# Patient Record
Sex: MALE | Race: WHITE | NOT HISPANIC OR LATINO
[De-identification: names, ages, dates, MRNs, and addresses within clinical notes are randomized per-mention and may not be internally consistent; named-entity substitution may affect disease eponyms.]

---

## 2017-05-23 ENCOUNTER — FORM ENCOUNTER (OUTPATIENT)
Age: 72
End: 2017-05-23

## 2017-05-24 ENCOUNTER — APPOINTMENT (OUTPATIENT)
Dept: ORTHOPEDIC SURGERY | Facility: CLINIC | Age: 72
End: 2017-05-24

## 2017-05-24 ENCOUNTER — OUTPATIENT (OUTPATIENT)
Dept: OUTPATIENT SERVICES | Facility: HOSPITAL | Age: 72
LOS: 1 days | End: 2017-05-24
Payer: MEDICARE

## 2017-05-24 VITALS — HEIGHT: 70 IN | BODY MASS INDEX: 26.92 KG/M2 | WEIGHT: 188 LBS

## 2017-05-24 DIAGNOSIS — K04.7 PERIAPICAL ABSCESS W/OUT SINUS: ICD-10-CM

## 2017-05-24 DIAGNOSIS — I87.2 VENOUS INSUFFICIENCY (CHRONIC) (PERIPHERAL): ICD-10-CM

## 2017-05-24 DIAGNOSIS — Z60.2 PROBLEMS RELATED TO LIVING ALONE: ICD-10-CM

## 2017-05-24 PROCEDURE — 72170 X-RAY EXAM OF PELVIS: CPT

## 2017-05-24 PROCEDURE — 73564 X-RAY EXAM KNEE 4 OR MORE: CPT | Mod: 26,50

## 2017-05-24 PROCEDURE — 72170 X-RAY EXAM OF PELVIS: CPT | Mod: 26

## 2017-05-24 PROCEDURE — 73564 X-RAY EXAM KNEE 4 OR MORE: CPT

## 2017-05-24 RX ORDER — METHYLPRED ACET/NACL,ISO-OS/PF 40 MG/ML
40 VIAL (ML) INJECTION
Qty: 1 | Refills: 0 | Status: COMPLETED | OUTPATIENT
Start: 2017-05-24

## 2017-05-24 RX ADMIN — Medication MG/ML: at 00:00

## 2017-05-24 SDOH — SOCIAL STABILITY - SOCIAL INSECURITY: PROBLEMS RELATED TO LIVING ALONE: Z60.2

## 2017-08-01 ENCOUNTER — MEDICATION RENEWAL (OUTPATIENT)
Age: 72
End: 2017-08-01

## 2018-06-28 ENCOUNTER — APPOINTMENT (OUTPATIENT)
Dept: VASCULAR SURGERY | Facility: CLINIC | Age: 73
End: 2018-06-28
Payer: MEDICARE

## 2018-06-28 ENCOUNTER — OTHER (OUTPATIENT)
Age: 73
End: 2018-06-28

## 2018-06-28 VITALS
DIASTOLIC BLOOD PRESSURE: 70 MMHG | HEIGHT: 70 IN | SYSTOLIC BLOOD PRESSURE: 104 MMHG | BODY MASS INDEX: 27.2 KG/M2 | WEIGHT: 190 LBS | HEART RATE: 74 BPM

## 2018-06-28 PROCEDURE — 93970 EXTREMITY STUDY: CPT

## 2018-06-28 PROCEDURE — 99203 OFFICE O/P NEW LOW 30 MIN: CPT

## 2018-06-28 RX ORDER — CHOLECALCIFEROL (VITAMIN D3) 25 MCG
TABLET ORAL
Refills: 0 | Status: COMPLETED | COMMUNITY
End: 2018-06-28

## 2018-06-28 RX ORDER — PNV NO.95/FERROUS FUM/FOLIC AC 28MG-0.8MG
TABLET ORAL
Refills: 0 | Status: COMPLETED | COMMUNITY
End: 2018-06-28

## 2018-06-28 RX ORDER — MULTIVIT-MIN/FA/LYCOPEN/LUTEIN .4-300-25
TABLET ORAL
Refills: 0 | Status: COMPLETED | COMMUNITY
End: 2018-06-28

## 2018-07-02 ENCOUNTER — OUTPATIENT (OUTPATIENT)
Dept: OUTPATIENT SERVICES | Facility: HOSPITAL | Age: 73
LOS: 1 days | End: 2018-07-02
Payer: MEDICARE

## 2018-07-02 DIAGNOSIS — Z01.818 ENCOUNTER FOR OTHER PREPROCEDURAL EXAMINATION: ICD-10-CM

## 2018-07-02 LAB
ALBUMIN SERPL ELPH-MCNC: 4.5 G/DL — SIGNIFICANT CHANGE UP (ref 3.3–5)
ALP SERPL-CCNC: 68 U/L — SIGNIFICANT CHANGE UP (ref 40–120)
ALT FLD-CCNC: 28 U/L — SIGNIFICANT CHANGE UP (ref 10–45)
ANION GAP SERPL CALC-SCNC: 10 MMOL/L — SIGNIFICANT CHANGE UP (ref 5–17)
APPEARANCE UR: CLEAR — SIGNIFICANT CHANGE UP
APTT BLD: 31.6 SEC — SIGNIFICANT CHANGE UP (ref 27.5–37.4)
AST SERPL-CCNC: 31 U/L — SIGNIFICANT CHANGE UP (ref 10–40)
BACTERIA # UR AUTO: PRESENT /HPF
BILIRUB SERPL-MCNC: 0.4 MG/DL — SIGNIFICANT CHANGE UP (ref 0.2–1.2)
BILIRUB UR-MCNC: NEGATIVE — SIGNIFICANT CHANGE UP
BUN SERPL-MCNC: 24 MG/DL — HIGH (ref 7–23)
CALCIUM SERPL-MCNC: 9.5 MG/DL — SIGNIFICANT CHANGE UP (ref 8.4–10.5)
CHLORIDE SERPL-SCNC: 101 MMOL/L — SIGNIFICANT CHANGE UP (ref 96–108)
CO2 SERPL-SCNC: 31 MMOL/L — SIGNIFICANT CHANGE UP (ref 22–31)
COLOR SPEC: YELLOW — SIGNIFICANT CHANGE UP
CREAT SERPL-MCNC: 0.84 MG/DL — SIGNIFICANT CHANGE UP (ref 0.5–1.3)
DIFF PNL FLD: ABNORMAL
EPI CELLS # UR: SIGNIFICANT CHANGE UP /HPF (ref 0–5)
GLUCOSE SERPL-MCNC: 126 MG/DL — HIGH (ref 70–99)
GLUCOSE UR QL: NEGATIVE — SIGNIFICANT CHANGE UP
HCT VFR BLD CALC: 47.7 % — SIGNIFICANT CHANGE UP (ref 39–50)
HGB BLD-MCNC: 15.5 G/DL — SIGNIFICANT CHANGE UP (ref 13–17)
INR BLD: 0.93 — SIGNIFICANT CHANGE UP (ref 0.88–1.16)
KETONES UR-MCNC: NEGATIVE — SIGNIFICANT CHANGE UP
LEUKOCYTE ESTERASE UR-ACNC: NEGATIVE — SIGNIFICANT CHANGE UP
MCHC RBC-ENTMCNC: 30.7 PG — SIGNIFICANT CHANGE UP (ref 27–34)
MCHC RBC-ENTMCNC: 32.5 G/DL — SIGNIFICANT CHANGE UP (ref 32–36)
MCV RBC AUTO: 94.5 FL — SIGNIFICANT CHANGE UP (ref 80–100)
NITRITE UR-MCNC: NEGATIVE — SIGNIFICANT CHANGE UP
PH UR: 6 — SIGNIFICANT CHANGE UP (ref 5–8)
PLATELET # BLD AUTO: 242 K/UL — SIGNIFICANT CHANGE UP (ref 150–400)
POTASSIUM SERPL-MCNC: 4.9 MMOL/L — SIGNIFICANT CHANGE UP (ref 3.5–5.3)
POTASSIUM SERPL-SCNC: 4.9 MMOL/L — SIGNIFICANT CHANGE UP (ref 3.5–5.3)
PROT SERPL-MCNC: 6.7 G/DL — SIGNIFICANT CHANGE UP (ref 6–8.3)
PROT UR-MCNC: NEGATIVE MG/DL — SIGNIFICANT CHANGE UP
PROTHROM AB SERPL-ACNC: 10.3 SEC — SIGNIFICANT CHANGE UP (ref 9.8–12.7)
RBC # BLD: 5.05 M/UL — SIGNIFICANT CHANGE UP (ref 4.2–5.8)
RBC # FLD: 12.6 % — SIGNIFICANT CHANGE UP (ref 10.3–16.9)
RBC CASTS # UR COMP ASSIST: < 5 /HPF — SIGNIFICANT CHANGE UP
SODIUM SERPL-SCNC: 142 MMOL/L — SIGNIFICANT CHANGE UP (ref 135–145)
SP GR SPEC: 1.02 — SIGNIFICANT CHANGE UP (ref 1–1.03)
UROBILINOGEN FLD QL: 0.2 E.U./DL — SIGNIFICANT CHANGE UP
WBC # BLD: 5.8 K/UL — SIGNIFICANT CHANGE UP (ref 3.8–10.5)
WBC # FLD AUTO: 5.8 K/UL — SIGNIFICANT CHANGE UP (ref 3.8–10.5)
WBC UR QL: < 5 /HPF — SIGNIFICANT CHANGE UP

## 2018-07-02 PROCEDURE — 81001 URINALYSIS AUTO W/SCOPE: CPT

## 2018-07-02 PROCEDURE — 93005 ELECTROCARDIOGRAM TRACING: CPT

## 2018-07-02 PROCEDURE — 93010 ELECTROCARDIOGRAM REPORT: CPT

## 2018-07-02 PROCEDURE — 85027 COMPLETE CBC AUTOMATED: CPT

## 2018-07-02 PROCEDURE — 85610 PROTHROMBIN TIME: CPT

## 2018-07-02 PROCEDURE — 80053 COMPREHEN METABOLIC PANEL: CPT

## 2018-07-02 PROCEDURE — 85730 THROMBOPLASTIN TIME PARTIAL: CPT

## 2018-07-09 ENCOUNTER — APPOINTMENT (OUTPATIENT)
Dept: VASCULAR SURGERY | Facility: HOSPITAL | Age: 73
End: 2018-07-09

## 2018-07-09 ENCOUNTER — OUTPATIENT (OUTPATIENT)
Dept: OUTPATIENT SERVICES | Facility: HOSPITAL | Age: 73
LOS: 1 days | Discharge: HOME | End: 2018-07-09
Payer: MEDICARE

## 2018-07-09 PROCEDURE — 37766 PHLEB VEINS - EXTREM 20+: CPT | Mod: LT,GC

## 2018-07-12 ENCOUNTER — APPOINTMENT (OUTPATIENT)
Dept: VASCULAR SURGERY | Facility: CLINIC | Age: 73
End: 2018-07-12
Payer: MEDICARE

## 2018-07-12 VITALS — OXYGEN SATURATION: 98 % | DIASTOLIC BLOOD PRESSURE: 72 MMHG | SYSTOLIC BLOOD PRESSURE: 131 MMHG | HEART RATE: 68 BPM

## 2018-07-12 DIAGNOSIS — I83.893 VARICOSE VEINS OF BILATERAL LOWER EXTREMITIES WITH OTHER COMPLICATIONS: ICD-10-CM

## 2018-07-12 PROCEDURE — 99024 POSTOP FOLLOW-UP VISIT: CPT

## 2018-07-26 PROBLEM — I87.2 VENOUS INSUFFICIENCY: Status: RESOLVED | Noted: 2017-05-24 | Resolved: 2018-07-26

## 2018-08-08 ENCOUNTER — FORM ENCOUNTER (OUTPATIENT)
Age: 73
End: 2018-08-08

## 2018-08-09 ENCOUNTER — APPOINTMENT (OUTPATIENT)
Dept: ORTHOPEDIC SURGERY | Facility: CLINIC | Age: 73
End: 2018-08-09
Payer: MEDICARE

## 2018-08-09 ENCOUNTER — OUTPATIENT (OUTPATIENT)
Dept: OUTPATIENT SERVICES | Facility: HOSPITAL | Age: 73
LOS: 1 days | End: 2018-08-09
Payer: MEDICARE

## 2018-08-09 VITALS — BODY MASS INDEX: 27.2 KG/M2 | HEIGHT: 70 IN | WEIGHT: 190 LBS

## 2018-08-09 PROCEDURE — 73564 X-RAY EXAM KNEE 4 OR MORE: CPT

## 2018-08-09 PROCEDURE — 99213 OFFICE O/P EST LOW 20 MIN: CPT | Mod: 25

## 2018-08-09 PROCEDURE — 73564 X-RAY EXAM KNEE 4 OR MORE: CPT | Mod: 26,LT

## 2018-08-09 PROCEDURE — 20610 DRAIN/INJ JOINT/BURSA W/O US: CPT | Mod: LT

## 2018-08-09 RX ADMIN — DEXAMETHASONE SODIUM PHOSPHATE 1 MG/30ML: 4 INJECTION, SOLUTION INTRA-ARTICULAR; INTRALESIONAL; INTRAMUSCULAR; INTRAVENOUS; SOFT TISSUE at 00:00

## 2019-01-31 ENCOUNTER — EMERGENCY (EMERGENCY)
Facility: HOSPITAL | Age: 74
LOS: 1 days | Discharge: HOME | End: 2019-01-31
Attending: EMERGENCY MEDICINE | Admitting: EMERGENCY MEDICINE
Payer: MEDICARE

## 2019-01-31 VITALS
SYSTOLIC BLOOD PRESSURE: 155 MMHG | DIASTOLIC BLOOD PRESSURE: 91 MMHG | HEART RATE: 73 BPM | WEIGHT: 182.98 LBS | RESPIRATION RATE: 17 BRPM | TEMPERATURE: 98 F | OXYGEN SATURATION: 98 %

## 2019-01-31 PROCEDURE — 99283 EMERGENCY DEPT VISIT LOW MDM: CPT

## 2019-01-31 PROCEDURE — 81001 URINALYSIS AUTO W/SCOPE: CPT

## 2019-01-31 PROCEDURE — 87491 CHLMYD TRACH DNA AMP PROBE: CPT

## 2019-01-31 PROCEDURE — 87591 N.GONORRHOEAE DNA AMP PROB: CPT

## 2019-01-31 PROCEDURE — 87086 URINE CULTURE/COLONY COUNT: CPT

## 2019-01-31 RX ORDER — CIPROFLOXACIN LACTATE 400MG/40ML
1 VIAL (ML) INTRAVENOUS
Qty: 10 | Refills: 0
Start: 2019-01-31 | End: 2019-02-09

## 2019-01-31 NOTE — ED PROVIDER NOTE - REFUSAL OF SERVICE, MDM
Patient fully understands the explained risks involved with this decision including worsening of medical condition, missed and or delayed diagnosis, permanent disability and death.  All alternative options given to patient who still desires discharge against medical advice from ED and will follow up as an outpatient.  Patient given the option to return to ED at any time to have further evaluation and treatment.

## 2019-01-31 NOTE — ED ADULT NURSE NOTE - NSIMPLEMENTINTERV_GEN_ALL_ED
Implemented All Universal Safety Interventions:  [place] to call system. Call bell, personal items and telephone within reach. Instruct patient to call for assistance. Room bathroom lighting operational. Non-slip footwear when patient is off stretcher. Physically safe environment: no spills, clutter or unnecessary equipment. Stretcher in lowest position, wheels locked, appropriate side rails in place.

## 2019-01-31 NOTE — ED PROVIDER NOTE - PHYSICAL EXAMINATION
CONSTITUTIONAL: Well-appearing; well-nourished; in no apparent distress.   HEAD: Normocephalic; atraumatic.   EYES:  conjunctiva and sclera clear  ENT: normal nose; no rhinorrhea;   NECK: Supple; non-tender;   RESPIRATORY: Breathing easily;   GI: Soft; non-distended; non-tender; no palpable organomegaly.   : R testicle enlarged, no redness, tender to palpation. penis normal appearing. normal appearing non tender L testicle  EXT: No cyanosis or edema; N/V intact  SKIN: Normal for age and race; warm; dry; good turgor; no apparent lesions or rash.   NEURO: A & O x 3; face symmetric; grossly unremarkable.   PSYCHOLOGICAL: The patient’s mood and manner are appropriate.

## 2019-01-31 NOTE — ED ADULT NURSE NOTE - OBJECTIVE STATEMENT
Pt CO Right sided testicle pain and swelling x2 days.  Pt states "I've had orchitis before."  Pt ambulating with steady gait.  Denies recent falls, trauma, dizziness, N/V/D, SOB ,Fevers and CP.  No sig PMHx per pt.

## 2019-01-31 NOTE — ED PROVIDER NOTE - NSFOLLOWUPINSTRUCTIONS_ED_ALL_ED_FT
Scrotal Pain    WHAT YOU NEED TO KNOW:    What do I need to know about scrotal pain? Scrotal pain can happen at any age. The cause of scrotal pain can range from a minor injury to a serious medical condition. It is very important to seek immediate care if you have scrotal pain. The pain may be a warning sign of a serious condition that will need treatment. Without immediate care, you may be at increased risk for losing a testicle or being sterile (not having children).    What may cause scrotal pain?     Torsion (twisting) of the testicle, cord that carries sperm from the testicle, or tissue attached to the testicle    An infection of the testicle or other area in the scrotum    A hydrocele (fluid buildup around the testicle) or varicocele (blood backup in veins in the scrotum)    An inguinal hernia (tissue pushed out of place in your groin)    Bello gangrene (tissue death of the area between the scrotum and anus)    A urinary tract infection or stone that is passing, or an infected appendix    An injury in your groin or scrotum    What are the warning signs of a serious medical problem? Seek care immediately if you have any of the following:     Pain that starts suddenly or is severe    Swelling in your scrotum or groin, especially if you also have severe pain or are vomiting    Red or black patches of skin on your scrotum or area between your penis and anus    Blisters anywhere in your groin or scrotum    A fever    How is the cause of scrotal pain diagnosed? Your healthcare provider will examine you and ask about your pain. Tell the provider when the pain started and how long it lasts. Your provider will ask if pain started in another area and moved to your scrotum. The pain may also have moved from your scrotum to another area. Tell your provider if you have pain during exercise or if you had an injury to your groin. Also tell your provider if you have any problems urinating or if any discharge came out of your penis. Your provider may also ask about your sexual activity.    Blood tests may be used to check for signs of infection.      Ultrasound pictures may show a problem with your testicles or tissues in your scrotum. An ultrasound may also show kidney stones or other problems that may be causing your pain.    How is scrotal pain treated? Treatment will depend on the cause of your pain:    Prescription pain medicine may be given. Ask your healthcare provider how to take this medicine safely. Some prescription pain medicines contain acetaminophen. Do not take other medicines that contain acetaminophen without talking to your healthcare provider. Too much acetaminophen may cause liver damage. Prescription pain medicine may cause constipation. Ask your healthcare provider how to prevent or treat constipation.       NSAIDs, such as ibuprofen, help decrease swelling, pain, and fever. This medicine is available with or without a doctor's order. NSAIDs can cause stomach bleeding or kidney problems in certain people. If you take blood thinner medicine, always ask your healthcare provider if NSAIDs are safe for you. Always read the medicine label and follow directions.    Antibiotics are used to treat a bacterial infection.    Surgery may be needed to untwist the testicle, or cord, or to remove dead or infected tissue.     What can I do to manage my symptoms?     Wear a support device, if directed. A support device, such as a jock strap, can help keep your scrotum lifted and supported. This can help decrease pain.    Apply ice to your scrotum. Ice helps decrease pain and swelling. Use an ice pack, or put crushed ice in a plastic bag. Cover the pack or bag with a towel before you apply it to your scrotum. Apply ice for 15 to 20 minutes every hour, or as directed.    When should I seek immediate care?     You have any warning signs of a serious problem.  You have pain or swelling that starts or gets worse quickly.  You have skin changes in your scrotum, such as a dark patch.  You have a fever.    When should I contact my healthcare provider?     Your pain does not get better, even after you take pain medicine.    You have new or worsening pain.  You have questions or concerns about your condition or care.    CARE AGREEMENT:    You have the right to help plan your care. Learn about your health condition and how it may be treated. Discuss treatment options with your healthcare providers to decide what care you want to receive. You always have the right to refuse treatment.             Testicular Torsion (not ruled out as you declined an ultrasound today)    WHAT YOU NEED TO KNOW:    What is testicular torsion? Testicular torsion is a condition in which the spermatic cord that holds the testicle gets twisted. The spermatic cord contains blood vessels and passageways for sperm. When the spermatic cord is twisted, blood flow to the testicle is reduced or blocked. This condition usually happens to only one testicle, but can happen to both. It usually affects babies up to 1 year of age and children 12 to 18 years of age.    What causes testicular torsion? The cause of this condition is not always known. A birth defect may cause it, and symptoms may only appear as you get older. You may have this condition if you play sports, exercise, or have an injury near your groin. Cold weather may also increase your risk.     What are the signs and symptoms of testicular torsion?     Severe pain and tenderness in your scrotum      Red and swollen scrotum      Testicles that appear to hang a bit higher than usual      Nausea and vomiting      Fever    How is testicular torsion diagnosed? Your healthcare provider will do a physical examination. He may ask you questions about your health and your symptoms. You may need the following tests:    Ultrasound: An ultrasound uses sound waves to show pictures on a monitor. An ultrasound may show problems in your testicles and spermatic cord, including abnormal blood flow.      Scintigraphy: This test uses radioactive dye to check for blood flow in the spermatic cord and scrotum. The dye helps the blood vessels show up better on the x-rays. Tell the healthcare provider if you have ever had an allergic reaction to contrast dye.    How is testicular torsion treated? You must see a healthcare provider as soon as possible. Your healthcare provider may try to untwist the spermatic cord by hand. Your healthcare provider may also give you medicine to decrease any pain or swelling. If your healthcare provider cannot untwist it by hand, you may need surgery. Your healthcare provider may have to make an incision on your scrotum to reach and untwist the affected testicle. Your healthcare provider may then attach the affected testicle to the wall of your scrotum to prevent it from twisting again. The unaffected testicle may also be attached to the scrotum to prevent testicular torsion.    What are the risks of testicular torsion? If you have surgery, you may bleed more than expected or get an infection. Even after treatment, your testicle may get smaller or have decreased sperm and hormone production. With or without treatment, the lack of blood flow to the testicle may lead to an infection. The testicle may shrink. The testicle may die and need to be removed completely. This may make it difficult for you to get a woman pregnant. If both testicles need to be removed, you will be sterile (unable to get a woman pregnant).     When should I contact my healthcare provider?     You have a fever.      Your skin is itchy, swollen, or has a rash.      You have questions or concerns about your condition or care.    When should I seek immediate care?     You have increased pain, swelling, or redness in your scrotum.        CARE AGREEMENT:    You have the right to help plan your care. Learn about your health condition and how it may be treated. Discuss treatment options with your healthcare providers to decide what care you want to receive. You always have the right to refuse treatment.

## 2019-01-31 NOTE — ED PROVIDER NOTE - OBJECTIVE STATEMENT
74yo M hx of prostate ca s/p seeds and surgery, recently diagnosed w/ orchitis several weeks ago and s/p unknown abx in [place] with resolution, now w/ recurrent pain and swelling like before since yesterday. pt states ejaculate has been more than usual since his surgery, but slow change, unsure if post surgical and now improving. denies any penile discharge. denies exposure to STDs but is sexually active, states other partner does not have stds. painful. gradual onset of pain and swelling. denies redness. 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 does not want further testing in the ED as he has a meeting at 11am.

## 2019-01-31 NOTE — ED PROVIDER NOTE - MEDICAL DECISION MAKING DETAILS
here w/ R testicular pain. refused US as he has a meeting at 11. explained cannot r/o torsion, pt chose to AMA. will start abx for presumed orchitis, but sent gc/chlamydia as well and urine culture given recurrent symptoms despite recent treatment here w/ R testicular pain. refused US as he has a meeting at 11. explained cannot r/o torsion without US, pt chose to AMA. will start abx for presumed orchitis, but sent gc/chlamydia as well and urine culture given recurrent symptoms despite recent treatment

## 2019-01-31 NOTE — ED PROVIDER NOTE - CARE PROVIDER_API CALL
Ramirez Garcia)  Urology  [address] [place] [address]  Phone: [phone]  Fax: [phone]

## 2019-02-01 LAB
C TRACH RRNA SPEC QL NAA+PROBE: SIGNIFICANT CHANGE UP
N GONORRHOEA RRNA SPEC QL NAA+PROBE: SIGNIFICANT CHANGE UP
SPECIMEN SOURCE: SIGNIFICANT CHANGE UP

## 2019-02-03 DIAGNOSIS — N50.811 RIGHT TESTICULAR PAIN: ICD-10-CM

## 2019-02-03 DIAGNOSIS — Z79.899 OTHER LONG TERM (CURRENT) DRUG THERAPY: ICD-10-CM

## 2019-02-03 DIAGNOSIS — Z79.2 LONG TERM (CURRENT) USE OF ANTIBIOTICS: ICD-10-CM

## 2019-02-03 DIAGNOSIS — N50.89 OTHER SPECIFIED DISORDERS OF THE MALE GENITAL ORGANS: ICD-10-CM

## 2019-02-14 ENCOUNTER — APPOINTMENT (OUTPATIENT)
Dept: INTERNAL MEDICINE | Facility: CLINIC | Age: 74
End: 2019-02-14
Payer: MEDICARE

## 2019-02-14 VITALS
HEART RATE: 74 BPM | HEIGHT: 70 IN | WEIGHT: 180 LBS | TEMPERATURE: 98.1 F | SYSTOLIC BLOOD PRESSURE: 121 MMHG | OXYGEN SATURATION: 98 % | BODY MASS INDEX: 25.77 KG/M2 | DIASTOLIC BLOOD PRESSURE: 72 MMHG

## 2019-02-14 DIAGNOSIS — Z98.890 OTHER SPECIFIED POSTPROCEDURAL STATES: ICD-10-CM

## 2019-02-14 DIAGNOSIS — Z11.3 ENCOUNTER FOR SCREENING FOR INFECTIONS WITH A PREDOMINANTLY SEXUAL MODE OF TRANSMISSION: ICD-10-CM

## 2019-02-14 DIAGNOSIS — Z87.891 PERSONAL HISTORY OF NICOTINE DEPENDENCE: ICD-10-CM

## 2019-02-14 DIAGNOSIS — Z87.898 PERSONAL HISTORY OF OTHER SPECIFIED CONDITIONS: ICD-10-CM

## 2019-02-14 DIAGNOSIS — Z87.09 PERSONAL HISTORY OF OTHER DISEASES OF THE RESPIRATORY SYSTEM: ICD-10-CM

## 2019-02-14 PROCEDURE — 36415 COLL VENOUS BLD VENIPUNCTURE: CPT

## 2019-02-14 PROCEDURE — G0438: CPT | Mod: 25

## 2019-02-14 RX ORDER — DOXYCYCLINE HYCLATE 100 MG/1
100 CAPSULE ORAL
Qty: 14 | Refills: 0 | Status: COMPLETED | COMMUNITY
Start: 2018-05-19 | End: 2019-02-14

## 2019-02-14 RX ORDER — MULTIVIT-MIN/FA/LYCOPEN/LUTEIN .4-300-25
TABLET ORAL
Refills: 0 | Status: ACTIVE | COMMUNITY

## 2019-02-14 NOTE — PHYSICAL EXAM
[No Acute Distress] : no acute distress [Well Nourished] : well nourished [Well Developed] : well developed [Well-Appearing] : well-appearing [Normal Sclera/Conjunctiva] : normal sclera/conjunctiva [EOMI] : extraocular movements intact [Normal Outer Ear/Nose] : the outer ears and nose were normal in appearance [Supple] : supple [No Lymphadenopathy] : no lymphadenopathy [Thyroid Normal, No Nodules] : the thyroid was normal and there were no nodules present [No Respiratory Distress] : no respiratory distress  [Clear to Auscultation] : lungs were clear to auscultation bilaterally [No Accessory Muscle Use] : no accessory muscle use [Normal Rate] : normal rate  [Regular Rhythm] : with a regular rhythm [Normal S1, S2] : normal S1 and S2 [No Murmur] : no murmur heard [No Edema] : there was no peripheral edema [No Extremity Clubbing/Cyanosis] : no extremity clubbing/cyanosis [Soft] : abdomen soft [Non Tender] : non-tender [Non-distended] : non-distended [No Masses] : no abdominal mass palpated [No HSM] : no HSM [Normal Posterior Cervical Nodes] : no posterior cervical lymphadenopathy [Normal Anterior Cervical Nodes] : no anterior cervical lymphadenopathy [No CVA Tenderness] : no CVA  tenderness [No Spinal Tenderness] : no spinal tenderness [No Joint Swelling] : no joint swelling [Grossly Normal Strength/Tone] : grossly normal strength/tone [No Rash] : no rash [Normal Gait] : normal gait [Coordination Grossly Intact] : coordination grossly intact [No Focal Deficits] : no focal deficits [Normal Affect] : the affect was normal [Alert and Oriented x3] : oriented to person, place, and time [Normal Mood] : the mood was normal [Normal Insight/Judgement] : insight and judgment were intact [de-identified] : right testicle slightly enlarged, scrotum normal, tender to palpation

## 2019-02-14 NOTE — HISTORY OF PRESENT ILLNESS
[FreeTextEntry1] : annual exam [de-identified] : enlarged testicle\par - two episodes\par - 1st was after intercourse while in [place], right testicle became swollen and painful\par - 2nd occurred here in [facility], same testicle swollen and tender, was examined at [facility] ED, tx w/  LVQ 500mg daily x 10days.\par - concerned b/c of h/o prostate CA s/p seeding complicated by uropathy requiring self cath for 1 year.\par - will need new urologist to f/u w/, previously followed w/ Dr. Garcia.\par - no fever chills, dysuria or frequency or change in urinary stream\par \par h/o heavy smoker\par - on avg 4ppd\par - was told he had emphysema dx on CXR many years ago\par - has never had CT lung\par - last PFTs were also many years ago\par \par HCM\par - up to date.

## 2019-02-14 NOTE — HEALTH RISK ASSESSMENT
[Excellent] : ~his/her~  mood as  excellent [] : No [No falls in past year] : Patient reported no falls in the past year [0] : 2) Feeling down, depressed, or hopeless: Not at all (0) [[id number]] : 0 [HIV Test offered] : HIV Test offered [Hepatitis C test offered] : Hepatitis C test offered [Change in mental status noted] : No change in mental status noted [Language] : denies difficulty with language [Behavior] : denies difficulty with behavior [Learning/Retaining New Information] : denies difficulty learning/retaining new information [Handling Complex Tasks] : denies difficulty handling complex tasks [Reasoning] : denies difficulty with reasoning [Spatial Ability and Orientation] : denies difficulty with spatial ability and orientation

## 2019-02-15 ENCOUNTER — MEDICATION RENEWAL (OUTPATIENT)
Age: 74
End: 2019-02-15

## 2019-02-15 DIAGNOSIS — B00.9 HERPESVIRAL INFECTION, UNSPECIFIED: ICD-10-CM

## 2019-02-15 DIAGNOSIS — R76.8 OTHER SPECIFIED ABNORMAL IMMUNOLOGICAL FINDINGS IN SERUM: ICD-10-CM

## 2019-02-15 LAB
25(OH)D3 SERPL-MCNC: 23.1 NG/ML
ALBUMIN SERPL ELPH-MCNC: 3.8 G/DL
ALP BLD-CCNC: 72 U/L
ALT SERPL-CCNC: 31 U/L
ANION GAP SERPL CALC-SCNC: 10 MMOL/L
APPEARANCE: CLEAR
AST SERPL-CCNC: 29 U/L
BASOPHILS # BLD AUTO: 0.04 K/UL
BASOPHILS NFR BLD AUTO: 0.6 %
BILIRUB SERPL-MCNC: 0.4 MG/DL
BILIRUBIN URINE: NEGATIVE
BLOOD URINE: NEGATIVE
BUN SERPL-MCNC: 18 MG/DL
C TRACH RRNA SPEC QL NAA+PROBE: NOT DETECTED
CALCIUM SERPL-MCNC: 9 MG/DL
CHLORIDE SERPL-SCNC: 105 MMOL/L
CHOLEST SERPL-MCNC: 210 MG/DL
CHOLEST/HDLC SERPL: 3.3 RATIO
CO2 SERPL-SCNC: 26 MMOL/L
COLOR: YELLOW
CREAT SERPL-MCNC: 0.73 MG/DL
EOSINOPHIL # BLD AUTO: 0.17 K/UL
EOSINOPHIL NFR BLD AUTO: 2.5 %
GLUCOSE QUALITATIVE U: NEGATIVE MG/DL
GLUCOSE SERPL-MCNC: 100 MG/DL
HBA1C MFR BLD HPLC: 6 %
HCT VFR BLD CALC: 46.1 %
HCV AB SER QL: NONREACTIVE
HCV S/CO RATIO: 0.13 S/CO
HDLC SERPL-MCNC: 64 MG/DL
HGB BLD-MCNC: 14.7 G/DL
HIV1+2 AB SPEC QL IA.RAPID: NONREACTIVE
HSV 1+2 IGG SER IA-IMP: POSITIVE
HSV 1+2 IGG SER IA-IMP: POSITIVE
HSV1 IGG SER QL: 61.3 INDEX
HSV2 IGG SER QL: 2.05 INDEX
IMM GRANULOCYTES NFR BLD AUTO: 0.3 %
KETONES URINE: NEGATIVE
LDLC SERPL CALC-MCNC: 131 MG/DL
LEUKOCYTE ESTERASE URINE: NEGATIVE
LYMPHOCYTES # BLD AUTO: 1.94 K/UL
LYMPHOCYTES NFR BLD AUTO: 28.9 %
MAN DIFF?: NORMAL
MCHC RBC-ENTMCNC: 30.4 PG
MCHC RBC-ENTMCNC: 31.9 GM/DL
MCV RBC AUTO: 95.2 FL
MONOCYTES # BLD AUTO: 0.46 K/UL
MONOCYTES NFR BLD AUTO: 6.9 %
N GONORRHOEA RRNA SPEC QL NAA+PROBE: NOT DETECTED
NEUTROPHILS # BLD AUTO: 4.08 K/UL
NEUTROPHILS NFR BLD AUTO: 60.8 %
NITRITE URINE: NEGATIVE
PH URINE: 8
PLATELET # BLD AUTO: 310 K/UL
POTASSIUM SERPL-SCNC: 4.4 MMOL/L
PROT SERPL-MCNC: 6.2 G/DL
PROTEIN URINE: NEGATIVE MG/DL
PSA SERPL-MCNC: 0.01 NG/ML
RBC # BLD: 4.84 M/UL
RBC # FLD: 13.2 %
SODIUM SERPL-SCNC: 141 MMOL/L
SOURCE AMPLIFICATION: NORMAL
SPECIFIC GRAVITY URINE: 1.02
T PALLIDUM AB SER QL IA: NEGATIVE
T4 FREE SERPL-MCNC: 1.1 NG/DL
TRIGL SERPL-MCNC: 76 MG/DL
TSH SERPL-ACNC: 2.18 UIU/ML
UROBILINOGEN URINE: NEGATIVE MG/DL
WBC # FLD AUTO: 6.71 K/UL

## 2019-02-15 RX ORDER — LEVOFLOXACIN 500 MG/1
500 TABLET, FILM COATED ORAL
Qty: 10 | Refills: 0 | Status: COMPLETED | COMMUNITY
Start: 2019-01-31

## 2019-03-12 ENCOUNTER — FORM ENCOUNTER (OUTPATIENT)
Age: 74
End: 2019-03-12

## 2019-03-13 ENCOUNTER — OUTPATIENT (OUTPATIENT)
Dept: OUTPATIENT SERVICES | Facility: HOSPITAL | Age: 74
LOS: 1 days | End: 2019-03-13
Payer: MEDICARE

## 2019-03-13 PROCEDURE — 71046 X-RAY EXAM CHEST 2 VIEWS: CPT | Mod: 26

## 2019-03-13 PROCEDURE — 71046 X-RAY EXAM CHEST 2 VIEWS: CPT

## 2019-03-18 ENCOUNTER — APPOINTMENT (OUTPATIENT)
Dept: PULMONOLOGY | Facility: CLINIC | Age: 74
End: 2019-03-18
Payer: MEDICARE

## 2019-03-18 VITALS
DIASTOLIC BLOOD PRESSURE: 80 MMHG | HEIGHT: 70 IN | SYSTOLIC BLOOD PRESSURE: 120 MMHG | TEMPERATURE: 98.5 F | RESPIRATION RATE: 12 BRPM | HEART RATE: 75 BPM | WEIGHT: 193 LBS | BODY MASS INDEX: 27.63 KG/M2 | OXYGEN SATURATION: 98 %

## 2019-03-18 PROCEDURE — 99204 OFFICE O/P NEW MOD 45 MIN: CPT

## 2019-03-18 NOTE — HISTORY OF PRESENT ILLNESS
[Difficulty Breathing During Exertion] : denies dyspnea on exertion [Feelings Of Weakness On Exertion] : denies exercise intolerance [Chest Pain Or Discomfort] : denies chest pain [Wheezing] : denies wheezing [Regional Soft Tissue Swelling Both Lower Extremities] : denies lower extremity edema [Fever] : denies fever [0  -  Nothing at all] : 0, nothing at all [Cough] : stable coughing [FreeTextEntry1] : This is a 74 y/o man with who was  previously a 5 PPD chain smoker however he quit in 1979. The patient has a Hx of emphysema noted on a CT from 2009, but otherwise does not have a formal diagnosis of COPD and is not currently maintained on any inhalers. The patient presents today to establish care as he developed pain behind his right shoulder blade that felt reminiscent of a pain he had in 1979 where he was told he had a pulmonary nodule. The patient had an X-ray done which he came to review. 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 No hx of weight loss, night sweats, hemoptysis, or wheezing.

## 2019-03-18 NOTE — PHYSICAL EXAM
[General Appearance - Well Developed] : well developed [Normal Appearance] : normal appearance [General Appearance - Well Nourished] : well nourished [Well Groomed] : well groomed [General Appearance - In No Acute Distress] : no acute distress [No Deformities] : no deformities [Eyelids - No Xanthelasma] : the eyelids demonstrated no xanthelasmas [Normal Conjunctiva] : the conjunctiva exhibited no abnormalities [Neck Appearance] : the appearance of the neck was normal [Neck Cervical Mass (___cm)] : no neck mass was observed [Normal Oropharynx] : normal oropharynx [Jugular Venous Distention Increased] : there was no jugular-venous distention [Thyroid Diffuse Enlargement] : the thyroid was not enlarged [Heart Rate And Rhythm] : heart rate and rhythm were normal [Thyroid Nodule] : there were no palpable thyroid nodules [Heart Sounds] : normal S1 and S2 [Murmurs] : no murmurs present [Exaggerated Use Of Accessory Muscles For Inspiration] : no accessory muscle use [Respiration, Rhythm And Depth] : normal respiratory rhythm and effort [Auscultation Breath Sounds / Voice Sounds] : lungs were clear to auscultation bilaterally [Abdomen Soft] : soft [Abdomen Tenderness] : non-tender [Abdomen Mass (___ Cm)] : no abdominal mass palpated [Gait - Sufficient For Exercise Testing] : the gait was sufficient for exercise testing [Abnormal Walk] : normal gait [Cyanosis, Localized] : no localized cyanosis [Nail Clubbing] : no clubbing of the fingernails [] : no ischemic changes [Petechial Hemorrhages (___cm)] : no petechial hemorrhages [Deep Tendon Reflexes (DTR)] : deep tendon reflexes were 2+ and symmetric [Sensation] : the sensory exam was normal to light touch and pinprick [No Focal Deficits] : no focal deficits [Oriented To Time, Place, And Person] : oriented to person, place, and time [Affect] : the affect was normal [Impaired Insight] : insight and judgment were intact

## 2019-03-18 NOTE — END OF VISIT
[Time Spent: ___ minutes] : I have spent [unfilled] minutes of face to face time with the patient [>50% of Time Spent on Counseling and Coordination of Care for  ___] : Greater than 50% of the encounter time was spent on counseling and coordination of care for [unfilled] [] : Fellow

## 2019-03-18 NOTE — REVIEW OF SYSTEMS
[Cough] : cough [Negative] : Pulmonary Hypertension [Chills] : no chills [Fever] : no fever [Poor Appetite] : normal appetite  [Fatigue] : no fatigue [Hemoptysis] : no hemoptysis [Sputum] : not coughing up ~M sputum [Wheezing] : no wheezing [Dyspnea] : no dyspnea

## 2019-03-22 ENCOUNTER — FORM ENCOUNTER (OUTPATIENT)
Age: 74
End: 2019-03-22

## 2019-03-23 ENCOUNTER — APPOINTMENT (OUTPATIENT)
Dept: CT IMAGING | Facility: HOSPITAL | Age: 74
End: 2019-03-23
Payer: MEDICARE

## 2019-03-23 ENCOUNTER — OUTPATIENT (OUTPATIENT)
Dept: OUTPATIENT SERVICES | Facility: HOSPITAL | Age: 74
LOS: 1 days | End: 2019-03-23
Payer: MEDICARE

## 2019-03-23 PROCEDURE — 71250 CT THORAX DX C-: CPT

## 2019-03-23 PROCEDURE — 71250 CT THORAX DX C-: CPT | Mod: 26

## 2019-03-27 ENCOUNTER — OUTPATIENT (OUTPATIENT)
Dept: OUTPATIENT SERVICES | Facility: HOSPITAL | Age: 74
LOS: 1 days | End: 2019-03-27
Payer: MEDICARE

## 2019-03-27 DIAGNOSIS — J43.8 OTHER EMPHYSEMA: ICD-10-CM

## 2019-03-27 PROCEDURE — 94726 PLETHYSMOGRAPHY LUNG VOLUMES: CPT

## 2019-03-27 PROCEDURE — 94010 BREATHING CAPACITY TEST: CPT | Mod: 26

## 2019-03-27 PROCEDURE — 94060 EVALUATION OF WHEEZING: CPT

## 2019-03-27 PROCEDURE — 94729 DIFFUSING CAPACITY: CPT | Mod: 26

## 2019-03-27 PROCEDURE — 94729 DIFFUSING CAPACITY: CPT

## 2019-03-27 PROCEDURE — 94726 PLETHYSMOGRAPHY LUNG VOLUMES: CPT | Mod: 26

## 2019-03-27 PROCEDURE — 94760 N-INVAS EAR/PLS OXIMETRY 1: CPT

## 2019-04-19 ENCOUNTER — APPOINTMENT (OUTPATIENT)
Dept: ORTHOPEDIC SURGERY | Facility: CLINIC | Age: 74
End: 2019-04-19
Payer: MEDICARE

## 2019-04-19 PROCEDURE — 20610 DRAIN/INJ JOINT/BURSA W/O US: CPT | Mod: LT

## 2019-04-19 RX ADMIN — Medication 1 MG/2ML: at 00:00

## 2019-04-19 NOTE — PROCEDURE
[Injection] : Injection [Left] : of the left [Knee Joint] : knee joint [Osteoarthritis] : Osteoarthritis [Joint Pain] : Joint pain [Patient] : patient [Risk] : risk [Benefits] : benefits [Bleeding] : bleeding [Alternatives] : alternatives [Infection] : infection [Verbal Consent Obtained] : verbal consent was obtained prior to the procedure [Allergic Reaction] : allergic reaction [Alcohol] : Alcohol [Betadine] : Betadine [Ethyl Chloride Spray] : ethyl chloride spray was used as a topical anesthetic [Lateral] : lateral [20] : a 20-gauge [1.5  inch] : 1.5 inch needle [2 mL Euflexxa___(lot #)] : 2mL Euflexxa ~Ulot# [unfilled] [Bandage Applied] : a bandage [Tolerated Well] : The patient tolerated the procedure well [None] : none [No Strenuous Activity___day(s)] : avoid strenuous activity for [unfilled] day(s) [___ Week(s)] : in [unfilled] week(s) [de-identified] : Euflexxa# 1 of 3 \par Left knee joint\par Lot.No: [id number]\par Exp.Date: 2020-03-02\par Man: Jules

## 2019-04-19 NOTE — ADDENDUM
[FreeTextEntry1] : Dr. Yeager was physically present during the key portions the encounter, provided assistance as needed, and formulated the assessment and plan as documented above.\par \par

## 2019-04-25 ENCOUNTER — APPOINTMENT (OUTPATIENT)
Dept: UROLOGY | Facility: CLINIC | Age: 74
End: 2019-04-25
Payer: MEDICARE

## 2019-04-25 ENCOUNTER — APPOINTMENT (OUTPATIENT)
Dept: UROLOGY | Facility: CLINIC | Age: 74
End: 2019-04-25

## 2019-04-25 VITALS
HEART RATE: 73 BPM | OXYGEN SATURATION: 98 % | DIASTOLIC BLOOD PRESSURE: 82 MMHG | HEIGHT: 70 IN | TEMPERATURE: 98.3 F | WEIGHT: 193 LBS | SYSTOLIC BLOOD PRESSURE: 162 MMHG | BODY MASS INDEX: 27.63 KG/M2

## 2019-04-25 PROCEDURE — 99204 OFFICE O/P NEW MOD 45 MIN: CPT

## 2019-04-25 NOTE — HISTORY OF PRESENT ILLNESS
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 PSA 0.01. likely low risk disease. former smoker. +uirnary retention x 1.5 years post brachytherapy. possible post  brachy TURP. noted to have signficant swelling of right testicle. given abx. now 1 month history of right testis pain and tenderness. seen in ER at [facility]. started on abx x 7 days. minimla improvement noted. reports aching tenderness in right testis. reamains persistent bother. IPSS 4. no family hisotyr prostate kdiney bladder cancer.

## 2019-04-25 NOTE — PHYSICAL EXAM
[General Appearance - Well Developed] : well developed [General Appearance - Well Nourished] : well nourished [Normal Appearance] : normal appearance [Well Groomed] : well groomed [Heart Rate And Rhythm] : Heart rate and rhythm were normal [] : no respiratory distress [Abdomen Soft] : soft [Abdomen Tenderness] : non-tender [Abdomen Hernia] : no hernia was discovered [Costovertebral Angle Tenderness] : no ~M costovertebral angle tenderness [Urethral Meatus] : meatus normal [Penis Abnormality] : normal uncircumcised penis [Urinary Bladder Findings] : the bladder was normal on palpation [Scrotum] : the scrotum was normal [Epididymis] : the epididymides were normal [Testes Tenderness] : no tenderness of the testes [Testes Mass (___cm)] : there were no testicular masses [FreeTextEntry1] : right testis larger non tender. left soft nontender [Normal Station and Gait] : the gait and station were normal for the patient's age [Skin Color & Pigmentation] : normal skin color and pigmentation [Oriented To Time, Place, And Person] : oriented to person, place, and time [No Focal Deficits] : no focal deficits [No Palpable Adenopathy] : no palpable adenopathy

## 2019-04-26 ENCOUNTER — APPOINTMENT (OUTPATIENT)
Dept: ORTHOPEDIC SURGERY | Facility: CLINIC | Age: 74
End: 2019-04-26
Payer: MEDICARE

## 2019-04-26 PROCEDURE — 20610 DRAIN/INJ JOINT/BURSA W/O US: CPT | Mod: LT

## 2019-04-26 RX ADMIN — Medication 1 MG/2ML: at 00:00

## 2019-04-26 NOTE — PROCEDURE
[Injection] : Injection [Left] : of the left [Knee Joint] : knee joint [Osteoarthritis] : Osteoarthritis [Patient] : patient [Risk] : risk [Joint Pain] : Joint pain [Alternatives] : alternatives [Benefits] : benefits [Bleeding] : bleeding [Infection] : infection [Allergic Reaction] : allergic reaction [Verbal Consent Obtained] : verbal consent was obtained prior to the procedure [Ethyl Chloride Spray] : ethyl chloride spray was used as a topical anesthetic [Alcohol] : Alcohol [Betadine] : Betadine [20] : a 20-gauge [1.5  inch] : 1.5 inch needle [2 mL Euflexxa___(lot #)] : 2mL Euflexxa ~Ulot# [unfilled] [Bandage Applied] : a bandage [None] : none [No Strenuous Activity___day(s)] : avoid strenuous activity for [unfilled] day(s) [Tolerated Well] : The patient tolerated the procedure well [___ Week(s)] : in [unfilled] week(s) [de-identified] : Left knee\par Euflexxa Injection #2\par Lot # [id number]\par Exp: 2020-03-02

## 2019-04-29 LAB
APPEARANCE: CLEAR
BACTERIA UR CULT: NORMAL
BACTERIA: NEGATIVE
BILIRUBIN URINE: NEGATIVE
BLOOD URINE: NEGATIVE
COLOR: YELLOW
GLUCOSE QUALITATIVE U: NEGATIVE
HYALINE CASTS: 1 /LPF
KETONES URINE: NEGATIVE
LEUKOCYTE ESTERASE URINE: NEGATIVE
MICROSCOPIC-UA: NORMAL
NITRITE URINE: NEGATIVE
PH URINE: 7
PROTEIN URINE: NEGATIVE
RED BLOOD CELLS URINE: 4 /HPF
SPECIFIC GRAVITY URINE: 1.03
SQUAMOUS EPITHELIAL CELLS: 2 /HPF
UROBILINOGEN URINE: NORMAL
WHITE BLOOD CELLS URINE: 0 /HPF

## 2019-05-01 ENCOUNTER — FORM ENCOUNTER (OUTPATIENT)
Age: 74
End: 2019-05-01

## 2019-05-02 ENCOUNTER — APPOINTMENT (OUTPATIENT)
Dept: ULTRASOUND IMAGING | Facility: HOSPITAL | Age: 74
End: 2019-05-02
Payer: MEDICARE

## 2019-05-02 ENCOUNTER — OUTPATIENT (OUTPATIENT)
Dept: OUTPATIENT SERVICES | Facility: HOSPITAL | Age: 74
LOS: 1 days | End: 2019-05-02
Payer: MEDICARE

## 2019-05-02 PROCEDURE — 76870 US EXAM SCROTUM: CPT

## 2019-05-02 PROCEDURE — 76870 US EXAM SCROTUM: CPT | Mod: 26

## 2019-05-03 ENCOUNTER — APPOINTMENT (OUTPATIENT)
Dept: ORTHOPEDIC SURGERY | Facility: CLINIC | Age: 74
End: 2019-05-03
Payer: MEDICARE

## 2019-05-03 PROCEDURE — 20610 DRAIN/INJ JOINT/BURSA W/O US: CPT | Mod: LT

## 2019-05-03 RX ADMIN — Medication 1 MG/2ML: at 00:00

## 2019-05-03 NOTE — PROCEDURE
[Injection] : Injection [Left] : of the left [Knee Joint] : knee joint [Osteoarthritis] : Osteoarthritis [Joint Pain] : Joint pain [Patient] : patient [Risk] : risk [Alternatives] : alternatives [Benefits] : benefits [Bleeding] : bleeding [Allergic Reaction] : allergic reaction [Infection] : infection [Verbal Consent Obtained] : verbal consent was obtained prior to the procedure [Alcohol] : Alcohol [Betadine] : Betadine [Ethyl Chloride Spray] : ethyl chloride spray was used as a topical anesthetic [Lateral] : lateral [20] : a 20-gauge [1.5  inch] : 1.5 inch needle [2 mL Euflexxa___(lot #)] : 2mL Euflexxa ~Ulot# [unfilled] [Bandage Applied] : a bandage [Tolerated Well] : The patient tolerated the procedure well [None] : none [No Strenuous Activity___day(s)] : avoid strenuous activity for [unfilled] day(s) [PRN] : as needed [de-identified] : Left Knee Euflexxa Injection \par 3 of 3\par LOT: [id number]\par EXP: 2020-03-02

## 2019-05-06 ENCOUNTER — RESULT REVIEW (OUTPATIENT)
Age: 74
End: 2019-05-06

## 2019-09-24 ENCOUNTER — APPOINTMENT (OUTPATIENT)
Dept: INTERNAL MEDICINE | Facility: CLINIC | Age: 74
End: 2019-09-24
Payer: MEDICARE

## 2019-09-24 ENCOUNTER — FORM ENCOUNTER (OUTPATIENT)
Age: 74
End: 2019-09-24

## 2019-09-24 VITALS
BODY MASS INDEX: 25.2 KG/M2 | HEIGHT: 70 IN | TEMPERATURE: 98.2 F | HEART RATE: 75 BPM | OXYGEN SATURATION: 99 % | DIASTOLIC BLOOD PRESSURE: 76 MMHG | SYSTOLIC BLOOD PRESSURE: 132 MMHG | WEIGHT: 176 LBS

## 2019-09-24 DIAGNOSIS — N50.89 OTHER SPECIFIED DISORDERS OF THE MALE GENITAL ORGANS: ICD-10-CM

## 2019-09-24 PROCEDURE — 99214 OFFICE O/P EST MOD 30 MIN: CPT | Mod: 25

## 2019-09-24 PROCEDURE — 36415 COLL VENOUS BLD VENIPUNCTURE: CPT

## 2019-09-24 RX ORDER — MELOXICAM 15 MG/1
15 TABLET ORAL
Qty: 60 | Refills: 1 | Status: DISCONTINUED | COMMUNITY
Start: 2017-05-24 | End: 2019-09-24

## 2019-09-24 NOTE — REVIEW OF SYSTEMS
[Abdominal Pain] : abdominal pain [Constipation] : constipation [Heartburn] : heartburn [Negative] : Heme/Lymph

## 2019-09-24 NOTE — PHYSICAL EXAM
[No Acute Distress] : no acute distress [Well Nourished] : well nourished [Well Developed] : well developed [Normal Sclera/Conjunctiva] : normal sclera/conjunctiva [Well-Appearing] : well-appearing [EOMI] : extraocular movements intact [No Accessory Muscle Use] : no accessory muscle use [No Respiratory Distress] : no respiratory distress  [Clear to Auscultation] : lungs were clear to auscultation bilaterally [Soft] : abdomen soft [Non-distended] : non-distended [No Masses] : no abdominal mass palpated [Normal] : affect was normal and insight and judgment were intact [de-identified] : periumbilical tenderness

## 2019-09-24 NOTE — HISTORY OF PRESENT ILLNESS
[FreeTextEntry8] : Constipation\par - a/w change in stool caliber\par - at times will have small rock hard nuggets, or long thin strings, other times only tenesmus\par - pain was so bad that while in [place] he had to manually disimpact himself\par - states stool was like rocks\par - normal colonoscopy 1 year ago\par - concerned about pancreatic cancer. 2 friend recently dx and  from [place], a third friend is on chemo.\par - recently used fleet enema to help have BM.\par - abdominal pain a/w malaise and mild nausea, burning sensation over entire abdomen worse periumbilical\par \par ED\par - would like Viagra, too expensive from pharmacy\par \par lung nodule\par - has f/u CT scheduled tomorrow from pulm

## 2019-09-25 ENCOUNTER — APPOINTMENT (OUTPATIENT)
Dept: CT IMAGING | Facility: HOSPITAL | Age: 74
End: 2019-09-25
Payer: MEDICARE

## 2019-09-25 ENCOUNTER — OUTPATIENT (OUTPATIENT)
Dept: OUTPATIENT SERVICES | Facility: HOSPITAL | Age: 74
LOS: 1 days | End: 2019-09-25
Payer: MEDICARE

## 2019-09-25 LAB
ALBUMIN SERPL ELPH-MCNC: 4.1 G/DL
ALP BLD-CCNC: 65 U/L
ALT SERPL-CCNC: 24 U/L
AMYLASE/CREAT SERPL: 59 U/L
ANION GAP SERPL CALC-SCNC: 14 MMOL/L
AST SERPL-CCNC: 28 U/L
BASOPHILS # BLD AUTO: 0.04 K/UL
BASOPHILS NFR BLD AUTO: 0.6 %
BILIRUB SERPL-MCNC: 0.4 MG/DL
BUN SERPL-MCNC: 13 MG/DL
CALCIUM SERPL-MCNC: 9.2 MG/DL
CHLORIDE SERPL-SCNC: 104 MMOL/L
CO2 SERPL-SCNC: 25 MMOL/L
CREAT SERPL-MCNC: 0.78 MG/DL
EOSINOPHIL # BLD AUTO: 0.12 K/UL
EOSINOPHIL NFR BLD AUTO: 1.8 %
GLUCOSE SERPL-MCNC: 91 MG/DL
HCT VFR BLD CALC: 44.9 %
HGB BLD-MCNC: 14.6 G/DL
IMM GRANULOCYTES NFR BLD AUTO: 0.3 %
LPL SERPL-CCNC: 12 U/L
LYMPHOCYTES # BLD AUTO: 1.31 K/UL
LYMPHOCYTES NFR BLD AUTO: 19.9 %
MAN DIFF?: NORMAL
MCHC RBC-ENTMCNC: 30.7 PG
MCHC RBC-ENTMCNC: 32.5 GM/DL
MCV RBC AUTO: 94.3 FL
MONOCYTES # BLD AUTO: 0.47 K/UL
MONOCYTES NFR BLD AUTO: 7.1 %
NEUTROPHILS # BLD AUTO: 4.63 K/UL
NEUTROPHILS NFR BLD AUTO: 70.3 %
PLATELET # BLD AUTO: 232 K/UL
POTASSIUM SERPL-SCNC: 4.6 MMOL/L
PROT SERPL-MCNC: 6.5 G/DL
RBC # BLD: 4.76 M/UL
RBC # FLD: 12.4 %
SODIUM SERPL-SCNC: 143 MMOL/L
WBC # FLD AUTO: 6.59 K/UL

## 2019-09-25 PROCEDURE — 71250 CT THORAX DX C-: CPT | Mod: 26

## 2019-09-25 PROCEDURE — 71250 CT THORAX DX C-: CPT

## 2019-09-26 LAB — STRONGYLOIDES AB SER IA-ACNC: NEGATIVE

## 2019-09-29 ENCOUNTER — FORM ENCOUNTER (OUTPATIENT)
Age: 74
End: 2019-09-29

## 2019-09-30 ENCOUNTER — OUTPATIENT (OUTPATIENT)
Dept: OUTPATIENT SERVICES | Facility: HOSPITAL | Age: 74
LOS: 1 days | End: 2019-09-30
Payer: MEDICARE

## 2019-09-30 ENCOUNTER — APPOINTMENT (OUTPATIENT)
Dept: CT IMAGING | Facility: HOSPITAL | Age: 74
End: 2019-09-30
Payer: MEDICARE

## 2019-09-30 PROCEDURE — 74177 CT ABD & PELVIS W/CONTRAST: CPT | Mod: 26

## 2019-09-30 PROCEDURE — 74177 CT ABD & PELVIS W/CONTRAST: CPT

## 2019-10-02 PROBLEM — Z60.2 PERSON LIVING ALONE: Status: ACTIVE | Noted: 2017-05-24

## 2020-03-25 ENCOUNTER — APPOINTMENT (OUTPATIENT)
Dept: INTERNAL MEDICINE | Facility: CLINIC | Age: 75
End: 2020-03-25
Payer: MEDICARE

## 2020-03-25 PROCEDURE — 99441: CPT

## 2020-03-25 RX ORDER — AZITHROMYCIN 500 MG/1
500 TABLET, FILM COATED ORAL DAILY
Qty: 7 | Refills: 0 | Status: COMPLETED | COMMUNITY
Start: 2020-03-17 | End: 2020-03-25

## 2020-06-05 ENCOUNTER — LABORATORY RESULT (OUTPATIENT)
Age: 75
End: 2020-06-05

## 2020-06-05 ENCOUNTER — APPOINTMENT (OUTPATIENT)
Dept: INTERNAL MEDICINE | Facility: CLINIC | Age: 75
End: 2020-06-05
Payer: MEDICARE

## 2020-06-05 VITALS
WEIGHT: 189 LBS | SYSTOLIC BLOOD PRESSURE: 136 MMHG | HEART RATE: 76 BPM | DIASTOLIC BLOOD PRESSURE: 76 MMHG | OXYGEN SATURATION: 98 % | TEMPERATURE: 98.5 F | HEIGHT: 70 IN | BODY MASS INDEX: 27.06 KG/M2

## 2020-06-05 DIAGNOSIS — N52.9 MALE ERECTILE DYSFUNCTION, UNSPECIFIED: ICD-10-CM

## 2020-06-05 DIAGNOSIS — K56.41 FECAL IMPACTION: ICD-10-CM

## 2020-06-05 DIAGNOSIS — K59.00 CONSTIPATION, UNSPECIFIED: ICD-10-CM

## 2020-06-05 DIAGNOSIS — R19.5 OTHER FECAL ABNORMALITIES: ICD-10-CM

## 2020-06-05 DIAGNOSIS — Z87.898 PERSONAL HISTORY OF OTHER SPECIFIED CONDITIONS: ICD-10-CM

## 2020-06-05 DIAGNOSIS — Z11.3 ENCOUNTER FOR SCREENING FOR INFECTIONS WITH A PREDOMINANTLY SEXUAL MODE OF TRANSMISSION: ICD-10-CM

## 2020-06-05 PROCEDURE — 36415 COLL VENOUS BLD VENIPUNCTURE: CPT

## 2020-06-05 PROCEDURE — 99397 PER PM REEVAL EST PAT 65+ YR: CPT | Mod: 25

## 2020-06-05 RX ORDER — SILDENAFIL 100 MG/1
100 TABLET, FILM COATED ORAL
Qty: 30 | Refills: 3 | Status: COMPLETED | COMMUNITY
Start: 2019-09-24 | End: 2020-06-05

## 2020-06-05 NOTE — PHYSICAL EXAM
[No Acute Distress] : no acute distress [Well Nourished] : well nourished [Well Developed] : well developed [Well-Appearing] : well-appearing [Normal Sclera/Conjunctiva] : normal sclera/conjunctiva [EOMI] : extraocular movements intact [Normal Outer Ear/Nose] : the outer ears and nose were normal in appearance [No Lymphadenopathy] : no lymphadenopathy [Supple] : supple [Thyroid Normal, No Nodules] : the thyroid was normal and there were no nodules present [No Respiratory Distress] : no respiratory distress  [No Accessory Muscle Use] : no accessory muscle use [Clear to Auscultation] : lungs were clear to auscultation bilaterally [Normal Rate] : normal rate  [Regular Rhythm] : with a regular rhythm [Normal S1, S2] : normal S1 and S2 [No Murmur] : no murmur heard [No Varicosities] : no varicosities [No Edema] : there was no peripheral edema [No Palpable Aorta] : no palpable aorta [Soft] : abdomen soft [Non Tender] : non-tender [Non-distended] : non-distended [No Masses] : no abdominal mass palpated [No HSM] : no HSM [Normal Supraclavicular Nodes] : no supraclavicular lymphadenopathy [Normal Anterior Cervical Nodes] : no anterior cervical lymphadenopathy [No Joint Swelling] : no joint swelling [Grossly Normal Strength/Tone] : grossly normal strength/tone [No Rash] : no rash [Coordination Grossly Intact] : coordination grossly intact [Normal Gait] : normal gait [No Focal Deficits] : no focal deficits [Normal Affect] : the affect was normal [Alert and Oriented x3] : oriented to person, place, and time [Normal Mood] : the mood was normal [Normal Insight/Judgement] : insight and judgment were intact

## 2020-06-05 NOTE — HEALTH RISK ASSESSMENT
[Excellent] : ~his/her~  mood as  excellent [] : No [No] : No [No falls in past year] : Patient reported no falls in the past year [0] : 2) Feeling down, depressed, or hopeless: Not at all (0) [[id number]] : 0 [Change in mental status noted] : No change in mental status noted [Language] : denies difficulty with language [Handling Complex Tasks] : denies difficulty handling complex tasks [Employed] : employed [Single] : single [Sexually Active] : sexually active [Fully functional (bathing, dressing, toileting, transferring, walking, feeding)] : Fully functional (bathing, dressing, toileting, transferring, walking, feeding) [Fully functional (using the telephone, shopping, preparing meals, housekeeping, doing laundry, using] : Fully functional and needs no help or supervision to perform IADLs (using the telephone, shopping, preparing meals, housekeeping, doing laundry, using transportation, managing medications and managing finances) [With Patient/Caregiver] : With Patient/Caregiver [AdvancecareDate] : 06/05/2020

## 2020-06-05 NOTE — HISTORY OF PRESENT ILLNESS
[FreeTextEntry1] : annual exam [de-identified] : left knee OA\par - is painful, understands he needs a knee replacement\par - Dr. Yeager no longer accepts his insurance will need new ortho\par \par Pulm nodule\par - needs annual f/u w/ pulm 9/2020\par \par COVID19\par - PCR and Ab positive\par - feels much better now\par \par Vit D def\par - mistakenly took 50k daily instead of weekly\par \par HCM\par - up to date

## 2020-06-18 LAB
25(OH)D3 SERPL-MCNC: 51.1 NG/ML
ALBUMIN SERPL ELPH-MCNC: 4.3 G/DL
ALP BLD-CCNC: 72 U/L
ALT SERPL-CCNC: 50 U/L
ANION GAP SERPL CALC-SCNC: 11 MMOL/L
APPEARANCE: CLEAR
AST SERPL-CCNC: 35 U/L
BASOPHILS # BLD AUTO: 0.04 K/UL
BASOPHILS NFR BLD AUTO: 0.9 %
BILIRUB SERPL-MCNC: 0.3 MG/DL
BILIRUBIN URINE: NEGATIVE
BLOOD URINE: NEGATIVE
BUN SERPL-MCNC: 21 MG/DL
C TRACH RRNA SPEC QL NAA+PROBE: NOT DETECTED
CALCIUM SERPL-MCNC: 9.9 MG/DL
CHLORIDE SERPL-SCNC: 104 MMOL/L
CHOLEST SERPL-MCNC: 244 MG/DL
CHOLEST/HDLC SERPL: 4.1 RATIO
CO2 SERPL-SCNC: 29 MMOL/L
COLOR: YELLOW
CREAT SERPL-MCNC: 0.81 MG/DL
EOSINOPHIL # BLD AUTO: 0.2 K/UL
EOSINOPHIL NFR BLD AUTO: 4.4 %
ESTIMATED AVERAGE GLUCOSE: 114 MG/DL
ESTRADIOL SERPL-MCNC: 18 PG/ML
FOLATE SERPL-MCNC: >20 NG/ML
FSH SERPL-MCNC: 13.5 IU/L
GGT SERPL-CCNC: 34 U/L
GLUCOSE QUALITATIVE U: NEGATIVE
GLUCOSE SERPL-MCNC: 94 MG/DL
HBA1C MFR BLD HPLC: 5.6 %
HBV CORE IGG+IGM SER QL: NONREACTIVE
HBV SURFACE AB SERPL IA-ACNC: <3 MIU/ML
HBV SURFACE AG SER QL: NONREACTIVE
HCT VFR BLD CALC: 46 %
HDLC SERPL-MCNC: 60 MG/DL
HEPATITIS A IGG ANTIBODY: REACTIVE
HGB BLD-MCNC: 14.9 G/DL
HIV1+2 AB SPEC QL IA.RAPID: NONREACTIVE
IMM GRANULOCYTES NFR BLD AUTO: 0.4 %
IRON SATN MFR SERPL: 42 %
IRON SERPL-MCNC: 121 UG/DL
KETONES URINE: NEGATIVE
LDLC SERPL CALC-MCNC: 158 MG/DL
LEUKOCYTE ESTERASE URINE: NEGATIVE
LH SERPL-ACNC: 9.4 IU/L
LYMPHOCYTES # BLD AUTO: 1.27 K/UL
LYMPHOCYTES NFR BLD AUTO: 27.7 %
MAGNESIUM SERPL-MCNC: 2 MG/DL
MAN DIFF?: NORMAL
MCHC RBC-ENTMCNC: 31 PG
MCHC RBC-ENTMCNC: 32.4 GM/DL
MCV RBC AUTO: 95.6 FL
MONOCYTES # BLD AUTO: 0.39 K/UL
MONOCYTES NFR BLD AUTO: 8.5 %
N GONORRHOEA RRNA SPEC QL NAA+PROBE: NOT DETECTED
NEUTROPHILS # BLD AUTO: 2.67 K/UL
NEUTROPHILS NFR BLD AUTO: 58.1 %
NITRITE URINE: NEGATIVE
PH URINE: 7
PLATELET # BLD AUTO: 280 K/UL
POTASSIUM SERPL-SCNC: 5 MMOL/L
PROLACTIN SERPL-MCNC: 11.3 NG/ML
PROT SERPL-MCNC: 6.5 G/DL
PROTEIN URINE: NEGATIVE
PSA SERPL-MCNC: <0.01 NG/ML
RBC # BLD: 4.81 M/UL
RBC # FLD: 13.7 %
SELENIUM SERPL-MCNC: 164 UG/L
SODIUM SERPL-SCNC: 144 MMOL/L
SOURCE AMPLIFICATION: NORMAL
SPECIFIC GRAVITY URINE: 1.02
T PALLIDUM AB SER QL IA: NEGATIVE
T4 FREE SERPL-MCNC: 1.1 NG/DL
TESTOST SERPL-MCNC: 391 NG/DL
TIBC SERPL-MCNC: 292 UG/DL
TRIGL SERPL-MCNC: 127 MG/DL
TSH SERPL-ACNC: 3.52 UIU/ML
UIBC SERPL-MCNC: 170 UG/DL
UROBILINOGEN URINE: NORMAL
VIT B12 SERPL-MCNC: 712 PG/ML
WBC # FLD AUTO: 4.59 K/UL
ZINC SERPL-MCNC: 117 UG/DL

## 2020-07-13 ENCOUNTER — APPOINTMENT (OUTPATIENT)
Dept: ORTHOPEDIC SURGERY | Facility: CLINIC | Age: 75
End: 2020-07-13
Payer: MEDICARE

## 2020-07-13 VITALS — HEIGHT: 69 IN | WEIGHT: 185 LBS | BODY MASS INDEX: 27.4 KG/M2

## 2020-07-13 PROCEDURE — 73564 X-RAY EXAM KNEE 4 OR MORE: CPT | Mod: LT

## 2020-07-13 PROCEDURE — 99215 OFFICE O/P EST HI 40 MIN: CPT | Mod: 25

## 2020-07-13 PROCEDURE — 73560 X-RAY EXAM OF KNEE 1 OR 2: CPT | Mod: RT

## 2020-07-13 PROCEDURE — 20610 DRAIN/INJ JOINT/BURSA W/O US: CPT | Mod: LT

## 2020-07-13 NOTE — ADDENDUM
[FreeTextEntry1] : This note was written by Lacey Barrett on 07/13/2020 acting as scribe for Dr. Terrence Del Angel M.D.\par \par I, Dr. Terrence Del Angel M.D., have read and attest that all the information, medical decision making and discharge instructions within are true and accurate.

## 2020-07-13 NOTE — DISCUSSION/SUMMARY
[de-identified] : Discussed at length the nature of the patients condition and reviewed clinical imaging as detailed above. Their Left knee symptoms appear secondary to degenerative arthritis. We reviewed operative and nonoperative treatment. While I believe he would eventually benefit from a TKR, he is hesitant to have surgery at this time. Therefore, we will continue nonoperatively.  Patient was given a Left  knee cortisone injection today as detailed above for symptomatic relief. We will seek authorization for Left knee Euflexxa injections. Once we receive authorization, we will proceed accordingly. I also recommended home exercise and NSAID PRN.

## 2020-07-13 NOTE — PHYSICAL EXAM
[de-identified] : General appearance: well nourished and hydrated, pleasant, alert and oriented x 3, cooperative.\par HEENT: Normocephalic, EOM intact, Nasal septum midline, Oral cavity clear, External auditory canal clear.\par Cardiovascular: no apparent abnormalities, b/l lower leg edema, b/l LE varicosities, pedal pulses are palpable.\par Lymphatics Lymph nodes: none palpated, Lymphedema: not present.\par Neurologic: decrease to B/L LE sensation , no muscle weakness in upper or lower extremities, patella tendon reflexes intact .\par Dermatologic no apparent skin lesions, moist, warm, no rash.\par Spine: cervical spine appears normal and moves freely, thoracic spine appears normal and moves freely, lumbosacral spine appears normal and moves freely.\par Gait: nonantalgic.\par \par Left knee\par Inspection: mild effusion with varicose veins. dermolysis to back popliteal fossa. \par Wounds: none.\par Alignment: 10 degrees valgus alignment \par Palpation: no specific tenderness on palpation.\par ROM active (in degrees):0-125 with crepitus \par Ligamentous laxity: all ligaments appear stable,, negative ant. drawer test, negative post. drawer test, stable to varus stress test, stable to valgus stress test. negative Lachman's test, negative pivot shift test\par Meniscal Test: negative McMurrays, negative Bobby.\par Patellofemoral Alignment Test: Q angle-, normal.\par Muscle Test: good quad strength.\par Leg examination: calf is soft and non-tender.\par \par Right knee\par Inspection: no effusion or erythema.\par Wounds: none.\par Alignment: normal.\par Palpation: no specific tenderness on palpation.\par ROM active (in degrees): 0-120 with mild crepitus \par Ligamentous laxity: all ligaments appear stable,, negative ant. drawer test, negative post. drawer test, stable to varus stress test, stable to valgus stress test. negative Lachman's test, negative pivot shift test\par Meniscal Test: negative McMurrays, negative Bobby.\par Patellofemoral Alignment Test: Q angle-, normal.\par Muscle Test: good quad strength.\par Leg examination: calf is soft and non-tender.\par \par Left hip\par Inspection: No swelling or ecchymosis.\par Wounds: none.\par Palpation: non-tender.\par Stability: no instability.\par Strength: 5/5 all motor groups.\par ROM: no pain with FROM.\par Leg length: equal.\par \par Right hip\par Inspection: No swelling or ecchymosis.\par Wounds: none.\par Palpation: non-tender.\par Stability: no instability.\par Strength: 5/5 all motor groups.\par ROM: no pain with FROM.\par Leg length: equal.\par \par Left ankle\par Inspection: no erythema noted, no swelling noted.\par Palpation: no pain on palpation .\par ROM: FROM without crepitus.\par Muscle strength: 5/5.\par Stability: no instability noted.\par \par Right ankle\par Inspection: no erythema noted, no swelling noted.\par ROM: FROM without crepitus.\par Palpation: no pain on palpation .\par Muscle strength: 5/5.\par Stability: no instability noted.\par \par Left foot\par Inspection: color, texture and turgor are normal.\par ROM: full range of motion of all joints without pain or crepitus.\par Palpation: no tenderness.\par Stability: no instability noted.\par \par Right foot\par Inspection: color, texture and turgor are normal.\par ROM: full range of motion of all joints without pain or crepitus.\par Palpation: no tenderness.\par Stability: no instability noted.\par \par Left shoulder\par Inspection: no muscle asymmetry, no atrophy.\par Palpation: no tenderness noted, ACJ non-tender.\par ROM: full active ROM, full passive ROM.\par Strength testing): anterior deltoid, supraspinatus, infraspinatus, subscapularis all 5/5.\par Stability test: ant. apprehension negative, post. apprehension negative, relocation test negative.\par Impingement Test: negative NEER.\par \par Right shoulder\par Inspection: no muscle asymmetry, no atrophy.\par Palpation: no tenderness noted, ACJ non-tender.\par ROM: full active ROM, full passive ROM.\par Strength testing): anterior deltoid, supraspinatus, infraspinatus, subscapularis all 5/5.\par Stability test: ant. apprehension negative, post. apprehension negative, relocation test negative.\par Impingement Test: negative NEER.\par Surgical Wounds: none.\par \par Left elbow\par Inspection: negative swelling.\par Wounds: none.\par Palpation: non-tender.\par ROM: full ROM.\par Strength: 5/5 all groups.\par Stability: no instability.\par Mass: none.\par \par Right elbow\par Inspection: negative swelling.\par Wounds: none.\par Palpation: non-tender.\par ROM: full ROM.\par Strength: 5/5 all groups.\par Stability: no instability.\par Mass: none.\par \par Left wrist\par Inspection: negative swelling.\par Wound: none.\par Palpation (bone): no tenderness.\par ROM: full ROM.\par Strength: full , good.\par \par Right wrist\par Inspection: negative swelling.\par Wound: none.\par Palpation (bone): no tenderness.\par ROM: full ROM.\par Strength: full , good.\par \par Left hand\par Inspection: no skin changes, normal appearance.\par Wounds: none.\par Strength: full , able to make full fist.\par Sensation: light touch intact all fingers and thumb.\par Vascular: good capillary refill < 3 seconds, all fingers and thumb.\par Mass: none.\par \par Right hand\par Inspection: no skin changes, normal appearance. \par Wounds: none.\par Palpation: non-tender throughout.\par Strength: full , able to make full fist.\par Sensation: light touch intact all fingers and thumb.\par Vascular: good capillary refill < 3 seconds, all fingers and thumb.\par Mass: none.  [de-identified] : Left knee xrays, AP, lateral, merchant, and 45 degree PA standing view taken at the office today demonstrates diffuse tricompartmental degenerative arthritis, medial joint space narrowing, bone on bone, marginal osteophytes patella sits at an appropriate height in a central position, sclerosis, peripheral osteophytes, Kellgren and Min grade 4. Laterally tracking patella.\par \par Right knee xray merchant view, taken at the office today demonstrates good joint space and a well centered patella.

## 2020-07-13 NOTE — PROCEDURE
[de-identified] : LEFT  KNEE CORTISONE INJECTION\par Discussed at length with the patient the planned steroid and lidocaine injection. The risks, benefits, convalescence and alternatives were reviewed. The possible side effects discussed included but were not limited to: pain, swelling, heat and redness. There symptoms are generally mild but if they are extensive then contact the office. Giving pain relievers by mouth such as NSAID’s or Tylenol can generally treat the reactions to  steroid and lidocaine. Rare cases of infection have been noted. Rash, hives and itching may occur post injection. If you have muscle pain or cramps, flushing and or swelling of the face, rapid heart beat, nausea, dizziness, fever, chills, headache, difficulty breathing, swelling in the arms or legs, or have a prickly feeling of your skin, contact a health care provider immediately.\par  \par Following this discussion, the knee was prepped with betadine and under sterile conditions 9 cc of 1% lidocaine and 1 cc (40 mg) of Depo-Medrol were injected with a 21 gauge needle. The needle was introduced into the joint, aspiration was performed to ensure intra-articular placement and the medication was injected. Upon withdrawal of the needle the site was cleaned with alcohol and a bandaid applied. The patient tolerated the injection well and there were no adverse effects. Post injection instructions included no strenuous activity for 24 hours, cryotherapy and if there are any adverse effects to contact the office.

## 2020-07-13 NOTE — HISTORY OF PRESENT ILLNESS
[de-identified] : 75 year old male presents for initial evaluation of left knee pain and symptoms for approx 10 years. Patient denies history of injury to the left knee but describes the pain as  sharp and constant in nature. He states the pain is not as severe and transfers with weight bearing but is able to negotiate stairs alternating normally with use of the handrail. Patient states he took Meloxicam with mild relief. In addition he states he saw  Dr. Yeager who prescribed Cortisone shots and gel shots 2 years ago. He states the pain has persisted to the left knee after exercising and is here to discuss injections.

## 2020-10-05 ENCOUNTER — OUTPATIENT (OUTPATIENT)
Dept: OUTPATIENT SERVICES | Facility: HOSPITAL | Age: 75
LOS: 1 days | End: 2020-10-05
Payer: MEDICARE

## 2020-10-05 ENCOUNTER — APPOINTMENT (OUTPATIENT)
Dept: CT IMAGING | Facility: HOSPITAL | Age: 75
End: 2020-10-05
Payer: MEDICARE

## 2020-10-05 PROCEDURE — 71250 CT THORAX DX C-: CPT | Mod: 26

## 2020-10-05 PROCEDURE — 71250 CT THORAX DX C-: CPT

## 2020-10-23 ENCOUNTER — APPOINTMENT (OUTPATIENT)
Dept: PULMONOLOGY | Facility: CLINIC | Age: 75
End: 2020-10-23
Payer: MEDICARE

## 2020-10-23 DIAGNOSIS — Z77.098 CONTACT WITH AND (SUSPECTED) EXPOSURE TO OTHER HAZARDOUS, CHIEFLY NONMEDICINAL, CHEMICALS: ICD-10-CM

## 2020-10-23 DIAGNOSIS — Z11.59 ENCOUNTER FOR SCREENING FOR OTHER VIRAL DISEASES: ICD-10-CM

## 2020-10-23 DIAGNOSIS — U07.1 COVID-19: ICD-10-CM

## 2020-10-23 PROCEDURE — 99214 OFFICE O/P EST MOD 30 MIN: CPT | Mod: 95

## 2020-10-23 NOTE — REVIEW OF SYSTEMS
[Fever] : no fever [Chills] : no chills [Fatigue] : no fatigue [Poor Appetite] : normal appetite  [Cough] : no cough [Sputum] : not coughing up ~M sputum [Hemoptysis] : no hemoptysis [Dyspnea] : no dyspnea [Wheezing] : no wheezing [Negative] : Respiratory

## 2020-10-23 NOTE — ASSESSMENT
[FreeTextEntry1] : This is a 72 y/o man with a remote but extensive smoking hx, emphysema but no official diagnosis of COPD, a hx of pulmonary nodules. \par \par 1. Persistent cough - Improved\par \par 2. Emphysema - Minimal paraseptal emphysema in bilateral upper lobes seen on 10/2020 CT scan. The PFT was normal except he has response the bronchodilators for more than 200 mL in the FEV1. The picture is consistent with hyperactive airway. The PFT is inconsistent with COPD but the CT scan revealed emphysematous changes.   Pt is clinically stable walking 3-4 miles a day and going tot the gym without SOB.  \par \par 3. Pulmonary nodules - Review the images of his CT scan from 10/2020 multiple nodule and stable solid 5 mm nodule in the right lung base and GGO.  Follow up in on year with repeat CT scan.  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 COVID infection - Pt had mild symptoms.  No signs of fibrosis on CT scan from 10/2020.

## 2020-10-23 NOTE — HISTORY OF PRESENT ILLNESS
[Difficulty Breathing During Exertion] : denies dyspnea on exertion [Feelings Of Weakness On Exertion] : denies exercise intolerance [Wheezing] : denies wheezing [Regional Soft Tissue Swelling Both Lower Extremities] : denies lower extremity edema [Chest Pain Or Discomfort] : denies chest pain [Fever] : denies fever [0  -  Nothing at all] : 0, nothing at all [Home] : at home, [unfilled] , at the time of the visit. [Medical Office: ([facility])___] : at the medical office located in  [Verbal consent obtained from patient] : the patient, [unfilled] [TextBox_4] : This is a 72 y/o man with who was  previously a 5 PPD chain smoker however he quit in 1979. The patient has a Hx of emphysema noted on a CT,  but PFT 2019 was consistent with asthma.  He is not currently on inhalers and does not want a LIZZETTE. The patient presents today to review his CT scan from 10/2020.  In the past he developed pain behind his right shoulder blade that felt reminiscent of a pain he had in 1979 where he was told he had a pulmonary nodule. He is no longer having the pain.  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  Pt had COVID back in March 2020 did not require hospitalization and had symptoms of cough since resolved.  No hx of weight loss, night sweats, hemoptysis, or wheezing. He denies any SOB and walks 3-4 miles a day and goes to gym. [Stable] : are stable [Cough] : denies coughing

## 2021-01-26 ENCOUNTER — APPOINTMENT (OUTPATIENT)
Dept: PULMONOLOGY | Facility: CLINIC | Age: 76
End: 2021-01-26

## 2021-03-10 ENCOUNTER — NON-APPOINTMENT (OUTPATIENT)
Age: 76
End: 2021-03-10

## 2021-03-15 LAB
APPEARANCE: CLEAR
BACTERIA: NEGATIVE
BILIRUBIN URINE: NEGATIVE
BLOOD URINE: NORMAL
COLOR: YELLOW
GLUCOSE QUALITATIVE U: NEGATIVE
HYALINE CASTS: 0 /LPF
KETONES URINE: NEGATIVE
LEUKOCYTE ESTERASE URINE: NEGATIVE
MICROSCOPIC-UA: NORMAL
NITRITE URINE: NEGATIVE
PH URINE: 5.5
PROTEIN URINE: NORMAL
RED BLOOD CELLS URINE: 3 /HPF
SPECIFIC GRAVITY URINE: 1.02
SQUAMOUS EPITHELIAL CELLS: 1 /HPF
UROBILINOGEN URINE: NORMAL
WHITE BLOOD CELLS URINE: 1 /HPF

## 2021-03-17 ENCOUNTER — NON-APPOINTMENT (OUTPATIENT)
Age: 76
End: 2021-03-17

## 2021-03-17 LAB
APPEARANCE: CLEAR
BACTERIA: NEGATIVE
BILIRUBIN URINE: NEGATIVE
BLOOD URINE: NORMAL
COLOR: NORMAL
GLUCOSE QUALITATIVE U: NEGATIVE
HYALINE CASTS: 0 /LPF
KETONES URINE: NEGATIVE
LEUKOCYTE ESTERASE URINE: NEGATIVE
MICROSCOPIC-UA: NORMAL
NITRITE URINE: NEGATIVE
PH URINE: 6.5
PROTEIN URINE: NEGATIVE
RED BLOOD CELLS URINE: 2 /HPF
SPECIFIC GRAVITY URINE: 1.01
SQUAMOUS EPITHELIAL CELLS: 0 /HPF
UROBILINOGEN URINE: NORMAL
WHITE BLOOD CELLS URINE: 0 /HPF

## 2021-03-19 ENCOUNTER — NON-APPOINTMENT (OUTPATIENT)
Age: 76
End: 2021-03-19

## 2021-03-19 LAB — BACTERIA UR CULT: ABNORMAL

## 2021-03-24 ENCOUNTER — APPOINTMENT (OUTPATIENT)
Dept: INTERNAL MEDICINE | Facility: CLINIC | Age: 76
End: 2021-03-24

## 2021-03-25 ENCOUNTER — APPOINTMENT (OUTPATIENT)
Dept: UROLOGY | Facility: CLINIC | Age: 76
End: 2021-03-25
Payer: MEDICARE

## 2021-03-25 VITALS — HEART RATE: 72 BPM | SYSTOLIC BLOOD PRESSURE: 127 MMHG | TEMPERATURE: 97.9 F | DIASTOLIC BLOOD PRESSURE: 74 MMHG

## 2021-03-25 PROCEDURE — 99072 ADDL SUPL MATRL&STAF TM PHE: CPT

## 2021-03-25 PROCEDURE — 99213 OFFICE O/P EST LOW 20 MIN: CPT

## 2021-03-25 NOTE — HISTORY OF PRESENT ILLNESS
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 PSA 0.01. likely low risk disease. former smoker. +uirnary retention x 1.5 years post brachytherapy. possible post brachy TURP. \par new relationship starting 1.5 months ago\par nocturia x 4 over the last month\par decreased FOS\par no hematuria\par no fevers chills\par pt was concerned over symptoms that his sexual partner had presented with\par nocturia now 2-3/night\par PSA 0.01 6/2020\par now on saw palmetto

## 2021-03-25 NOTE — ASSESSMENT
[FreeTextEntry1] : OAB/LUTS\par lkely stress anxiety related\par UA UCX today\par GC today\par no evidence infection\par PSA today\par f/u 1year

## 2021-03-25 NOTE — PHYSICAL EXAM
[Urethral Meatus] : meatus normal [Penis Abnormality] : normal uncircumcised penis [Urinary Bladder Findings] : the bladder was normal on palpation [Scrotum] : the scrotum was normal [Epididymis] : the epididymides were normal [Testes Tenderness] : no tenderness of the testes [Testes Mass (___cm)] : there were no testicular masses

## 2021-03-26 LAB
APPEARANCE: CLEAR
BACTERIA: NEGATIVE
BILIRUBIN URINE: NEGATIVE
BLOOD URINE: NEGATIVE
COLOR: YELLOW
GLUCOSE QUALITATIVE U: NEGATIVE
HYALINE CASTS: 0 /LPF
KETONES URINE: NEGATIVE
LEUKOCYTE ESTERASE URINE: NEGATIVE
MICROSCOPIC-UA: NORMAL
N GONORRHOEA RRNA SPEC QL NAA+PROBE: NOT DETECTED
NITRITE URINE: NEGATIVE
PH URINE: 6
PROTEIN URINE: NEGATIVE
PSA SERPL-MCNC: <0.01 NG/ML
RED BLOOD CELLS URINE: 3 /HPF
SOURCE AMPLIFICATION: NORMAL
SPECIFIC GRAVITY URINE: 1.03
SQUAMOUS EPITHELIAL CELLS: 1 /HPF
UROBILINOGEN URINE: NORMAL
WHITE BLOOD CELLS URINE: 1 /HPF

## 2021-03-30 LAB — BACTERIA UR CULT: NORMAL

## 2021-04-22 ENCOUNTER — NON-APPOINTMENT (OUTPATIENT)
Age: 76
End: 2021-04-22

## 2021-04-22 ENCOUNTER — APPOINTMENT (OUTPATIENT)
Dept: INTERNAL MEDICINE | Facility: CLINIC | Age: 76
End: 2021-04-22
Payer: MEDICARE

## 2021-04-22 VITALS
HEIGHT: 69 IN | BODY MASS INDEX: 27.25 KG/M2 | WEIGHT: 184 LBS | SYSTOLIC BLOOD PRESSURE: 138 MMHG | HEART RATE: 70 BPM | OXYGEN SATURATION: 96 % | DIASTOLIC BLOOD PRESSURE: 89 MMHG | TEMPERATURE: 98.2 F

## 2021-04-22 DIAGNOSIS — I45.10 UNSPECIFIED RIGHT BUNDLE-BRANCH BLOCK: ICD-10-CM

## 2021-04-22 DIAGNOSIS — R06.02 SHORTNESS OF BREATH: ICD-10-CM

## 2021-04-22 DIAGNOSIS — R53.83 OTHER FATIGUE: ICD-10-CM

## 2021-04-22 PROCEDURE — 99214 OFFICE O/P EST MOD 30 MIN: CPT | Mod: 25

## 2021-04-22 PROCEDURE — 99072 ADDL SUPL MATRL&STAF TM PHE: CPT

## 2021-04-22 PROCEDURE — 93000 ELECTROCARDIOGRAM COMPLETE: CPT

## 2021-04-22 PROCEDURE — 36415 COLL VENOUS BLD VENIPUNCTURE: CPT

## 2021-04-22 NOTE — HISTORY OF PRESENT ILLNESS
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

## 2021-04-22 NOTE — PHYSICAL EXAM
[Well Nourished] : well nourished [Well Developed] : well developed [Well-Appearing] : well-appearing [Normal Sclera/Conjunctiva] : normal sclera/conjunctiva [EOMI] : extraocular movements intact [Normal Outer Ear/Nose] : the outer ears and nose were normal in appearance [No Lymphadenopathy] : no lymphadenopathy [Supple] : supple [Thyroid Normal, No Nodules] : the thyroid was normal and there were no nodules present [No Respiratory Distress] : no respiratory distress  [No Accessory Muscle Use] : no accessory muscle use [Clear to Auscultation] : lungs were clear to auscultation bilaterally [Normal Rate] : normal rate  [Regular Rhythm] : with a regular rhythm [Normal S1, S2] : normal S1 and S2 [No Murmur] : no murmur heard [No Varicosities] : no varicosities [No Edema] : there was no peripheral edema [No Palpable Aorta] : no palpable aorta [Soft] : abdomen soft [Non Tender] : non-tender [Non-distended] : non-distended [No Masses] : no abdominal mass palpated [No HSM] : no HSM [Normal Supraclavicular Nodes] : no supraclavicular lymphadenopathy [Normal Anterior Cervical Nodes] : no anterior cervical lymphadenopathy [No Joint Swelling] : no joint swelling [Grossly Normal Strength/Tone] : grossly normal strength/tone [No Rash] : no rash [Coordination Grossly Intact] : coordination grossly intact [No Focal Deficits] : no focal deficits [Normal Gait] : normal gait [Normal Affect] : the affect was normal [Alert and Oriented x3] : oriented to person, place, and time [Normal Mood] : the mood was normal [Normal Insight/Judgement] : insight and judgment were intact

## 2021-04-22 NOTE — HISTORY OF PRESENT ILLNESS
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

## 2021-04-26 RX ORDER — ERGOCALCIFEROL 1.25 MG/1
1.25 MG CAPSULE, LIQUID FILLED ORAL
Qty: 12 | Refills: 3 | Status: COMPLETED | COMMUNITY
Start: 2019-02-15 | End: 2021-04-26

## 2021-04-27 LAB
25(OH)D3 SERPL-MCNC: 36.3 NG/ML
ALBUMIN SERPL ELPH-MCNC: 4.3 G/DL
ALP BLD-CCNC: 83 U/L
ALT SERPL-CCNC: 50 U/L
ANION GAP SERPL CALC-SCNC: 10 MMOL/L
APPEARANCE: CLEAR
AST SERPL-CCNC: 39 U/L
BASOPHILS # BLD AUTO: 0.02 K/UL
BASOPHILS NFR BLD AUTO: 0.4 %
BILIRUB SERPL-MCNC: 0.3 MG/DL
BILIRUBIN URINE: NEGATIVE
BLOOD URINE: NEGATIVE
BUN SERPL-MCNC: 19 MG/DL
CALCIUM SERPL-MCNC: 10 MG/DL
CHLORIDE SERPL-SCNC: 102 MMOL/L
CHOLEST SERPL-MCNC: 237 MG/DL
CO2 SERPL-SCNC: 29 MMOL/L
COLOR: NORMAL
CREAT SERPL-MCNC: 0.82 MG/DL
EOSINOPHIL # BLD AUTO: 0.18 K/UL
EOSINOPHIL NFR BLD AUTO: 3.3 %
ESTIMATED AVERAGE GLUCOSE: 111 MG/DL
FOLATE SERPL-MCNC: >20 NG/ML
GLUCOSE QUALITATIVE U: NEGATIVE
GLUCOSE SERPL-MCNC: 89 MG/DL
HBA1C MFR BLD HPLC: 5.5 %
HCT VFR BLD CALC: 47.2 %
HDLC SERPL-MCNC: 74 MG/DL
HGB BLD-MCNC: 15.1 G/DL
IMM GRANULOCYTES NFR BLD AUTO: 0.2 %
IRON SATN MFR SERPL: 37 %
IRON SERPL-MCNC: 107 UG/DL
KETONES URINE: NEGATIVE
LDLC SERPL CALC-MCNC: 142 MG/DL
LEUKOCYTE ESTERASE URINE: NEGATIVE
LYMPHOCYTES # BLD AUTO: 1.46 K/UL
LYMPHOCYTES NFR BLD AUTO: 26.7 %
MAN DIFF?: NORMAL
MCHC RBC-ENTMCNC: 30.6 PG
MCHC RBC-ENTMCNC: 32 GM/DL
MCV RBC AUTO: 95.7 FL
MONOCYTES # BLD AUTO: 0.49 K/UL
MONOCYTES NFR BLD AUTO: 9 %
NEUTROPHILS # BLD AUTO: 3.3 K/UL
NEUTROPHILS NFR BLD AUTO: 60.4 %
NITRITE URINE: NEGATIVE
NONHDLC SERPL-MCNC: 163 MG/DL
PH URINE: 6
PLATELET # BLD AUTO: 230 K/UL
POTASSIUM SERPL-SCNC: 4.8 MMOL/L
PROT SERPL-MCNC: 6.9 G/DL
PROTEIN URINE: NEGATIVE
RBC # BLD: 4.93 M/UL
RBC # FLD: 12.4 %
SODIUM SERPL-SCNC: 141 MMOL/L
SPECIFIC GRAVITY URINE: 1.01
TIBC SERPL-MCNC: 291 UG/DL
TRIGL SERPL-MCNC: 104 MG/DL
TSH SERPL-ACNC: 2.97 UIU/ML
UIBC SERPL-MCNC: 184 UG/DL
UROBILINOGEN URINE: NORMAL
VIT B12 SERPL-MCNC: 601 PG/ML
WBC # FLD AUTO: 5.46 K/UL

## 2021-06-14 ENCOUNTER — APPOINTMENT (OUTPATIENT)
Dept: ORTHOPEDIC SURGERY | Facility: CLINIC | Age: 76
End: 2021-06-14
Payer: MEDICARE

## 2021-06-14 VITALS
BODY MASS INDEX: 27.4 KG/M2 | HEIGHT: 69 IN | HEART RATE: 74 BPM | WEIGHT: 185 LBS | DIASTOLIC BLOOD PRESSURE: 77 MMHG | SYSTOLIC BLOOD PRESSURE: 144 MMHG | OXYGEN SATURATION: 98 %

## 2021-06-14 PROCEDURE — 73564 X-RAY EXAM KNEE 4 OR MORE: CPT | Mod: LT

## 2021-06-14 PROCEDURE — 99072 ADDL SUPL MATRL&STAF TM PHE: CPT

## 2021-06-14 PROCEDURE — 73560 X-RAY EXAM OF KNEE 1 OR 2: CPT | Mod: RT

## 2021-06-14 PROCEDURE — 20610 DRAIN/INJ JOINT/BURSA W/O US: CPT | Mod: LT

## 2021-06-14 PROCEDURE — 99214 OFFICE O/P EST MOD 30 MIN: CPT | Mod: 25

## 2021-06-14 NOTE — PROCEDURE
[de-identified] : Discussed at length with the patient the planned steroid and lidocaine injection. The risks, benefits, convalescence and alternatives were reviewed. The possible side effects discussed included but were not limited to: pain, swelling, heat and redness. These symptoms are generally mild but if they are extensive then contact the office. Giving pain relievers by mouth such as NSAID’s or Tylenol can generally treat the reactions to steroid and lidocaine. Rare cases of infection have been noted. Rash, hives and itching may occur post injection. If you have muscle pain or cramps, flushing and or swelling of the face, rapid heart beat, nausea, dizziness, fever, chills, headache, difficulty breathing, swelling in the arms or legs, or have a prickly feeling of your skin, contact a health care provider immediately.\par \par Following this discussion, the knee was prepped with betadine and under sterile conditions 5 cc of 2% lidocaine and 1 cc depo-medrol (40mg) were injected with a 21 gauge needle. The needle was introduced into the joint, aspiration was performed to ensure intra-articular placement and the medication was injected. Upon withdrawal of the needle the site was cleaned with alcohol and a bandaid applied. The patient tolerated the injection well and there were no adverse effects. Post injection instructions included no strenuous activity for 24 hours, cryotherapy and if there are any adverse effects to contact the office.

## 2021-06-14 NOTE — HISTORY OF PRESENT ILLNESS
[de-identified] : 76 year old M Presents for follow up evaluation of left knee chronic pain related to his significant arthritis. He has had gel injections by Dr. HOLLOWAY in May 2019, he has had 2x cortisone injections in our office with the last one in July 2020, he take Meloxicam for pain on a PRN basis with relief. He remains active. He exercises frequently. He would like to discuss surgical options at this time. PMHX of HTN and HLD. He is planning a trip in September to [place] and would like to be able to continue with this plan.  [Ataxia] : no ataxia [Incontinence] : no incontinence [Loss of Dexterity] : good dexterity [Urinary Ret.] : no urinary retention

## 2021-06-14 NOTE — PHYSICAL EXAM
[Poor Appearance] : well-appearing [Acute Distress] : not in acute distress [Obese] : not obese [de-identified] : Left knee:\par Inspection: trace effusion or erythema.\par Wounds: none.\par Alignment: normal\par Palpation: Lateral left joint line tenderness on palpation.\par ROM: Active (in degrees) 0-120 ° with crepitus and discomfort. \par Ligamentous laxity (neg): all ligaments appear stable, negative ant. drawer test, negative post. drawer test,  negative Lachman's test, negative pivot shift test, 12 ° Varus \par Meniscal Test: negative McMurrays, negative Bobby.\par Patellofemoral Alignment Test: Q angle-, normal.\par Muscle Test: good quad strength.\par Leg examination: calf is soft and non-tender.  [de-identified] : Left knee xrays, AP, lateral, merchant, and 45 degree PA standing view taken at the office today demonstrates diffuse tricompartmental degenerative arthritis, medial joint space narrowing, bone on bone, marginal osteophytes patella sits at an appropriate height in a central position, sclerosis, peripheral osteophytes, Kellgren and Min grade 4. Laterally tracking patella.\par \par Right knee xray merchant view, taken at the office today demonstrates good joint space and a well centered patella. \par

## 2021-06-14 NOTE — DISCUSSION/SUMMARY
[de-identified] : Discussed at length the nature of the patients condition. Their left knee symptoms appear secondary to chronic degenerative arthritis. We reveiwed non operative and operative treatment. Discussed at length the natural history of left knee degenerative arthritis and reviewed non-operative and operative treatment including injections, gentleman will be scheduled for october 2021. NSAIDs, and physiotherapy, and associated disability I recommend a left total knee replacement. The risks, benefits, convalescence and alternatives were reviewed. Numerous questions were asked and answered. We did discuss implant choice and fixation, with shared decision making with the patient. Models were used as an educational tool.  Arrangements for home PT were discussed. Surgery will be scheduled at a convenient time. Preop medical clearance. Patient tolerated an injection well in office today. \par \par

## 2021-06-14 NOTE — ADDENDUM
[FreeTextEntry1] : Documented by Pavel Knowles acting as a scribe for Dr. Terrence Del Angel on 06/14/2021. All medical record entries made by the Scribe were at my, Dr. Terrence Del Angel, direction and personally dictated by me on 06/14/2021 . I have reviewed the chart and agree that the record accurately reflects my personal performance of the history, physical exam, assessment and plan. I have also personally directed, reviewed, and agreed with the chart.

## 2021-06-25 ENCOUNTER — APPOINTMENT (OUTPATIENT)
Dept: INTERNAL MEDICINE | Facility: CLINIC | Age: 76
End: 2021-06-25
Payer: MEDICARE

## 2021-06-25 ENCOUNTER — NON-APPOINTMENT (OUTPATIENT)
Age: 76
End: 2021-06-25

## 2021-06-25 VITALS
TEMPERATURE: 98.4 F | OXYGEN SATURATION: 96 % | SYSTOLIC BLOOD PRESSURE: 132 MMHG | DIASTOLIC BLOOD PRESSURE: 70 MMHG | HEART RATE: 75 BPM | BODY MASS INDEX: 26.66 KG/M2 | HEIGHT: 69 IN | WEIGHT: 180 LBS

## 2021-06-25 DIAGNOSIS — R41.3 OTHER AMNESIA: ICD-10-CM

## 2021-06-25 DIAGNOSIS — E55.9 VITAMIN D DEFICIENCY, UNSPECIFIED: ICD-10-CM

## 2021-06-25 PROCEDURE — 99397 PER PM REEVAL EST PAT 65+ YR: CPT

## 2021-06-25 PROCEDURE — 99072 ADDL SUPL MATRL&STAF TM PHE: CPT

## 2021-06-25 NOTE — HISTORY OF PRESENT ILLNESS
[FreeTextEntry1] : Annual Physcial  [de-identified] : Mr. Louise is a 76 Y/M \par -overdoing well\par \par Onychomycosis\par - needs CMP results for podiatrist for baseline liver enzymes \par \par Cardiology \par - patient denies chest pain, SOB, \par - endorse that he has had many normal stress tests\par \par Memory loss\par - concerned b/c paternal grandmother  of dementi at 74yo\par \par b/l varicose veins\par - s/p radiofrequency ablation.\par \par knee arthritis\par - scheduled for TKR in October when he returns\par - would like refill of meloxicam\par \par HCM\par - completed covid vax, does not have copy of cards\par - c scope up to date

## 2021-07-23 ENCOUNTER — APPOINTMENT (OUTPATIENT)
Dept: OPHTHALMOLOGY | Facility: CLINIC | Age: 76
End: 2021-07-23
Payer: MEDICARE

## 2021-07-23 ENCOUNTER — NON-APPOINTMENT (OUTPATIENT)
Age: 76
End: 2021-07-23

## 2021-07-23 PROCEDURE — 99072 ADDL SUPL MATRL&STAF TM PHE: CPT

## 2021-07-23 PROCEDURE — 92250 FUNDUS PHOTOGRAPHY W/I&R: CPT

## 2021-07-23 PROCEDURE — 92004 COMPRE OPH EXAM NEW PT 1/>: CPT

## 2021-07-23 PROCEDURE — 92136 OPHTHALMIC BIOMETRY: CPT

## 2021-07-30 ENCOUNTER — NON-APPOINTMENT (OUTPATIENT)
Age: 76
End: 2021-07-30

## 2021-07-30 ENCOUNTER — APPOINTMENT (OUTPATIENT)
Dept: OPHTHALMOLOGY | Facility: CLINIC | Age: 76
End: 2021-07-30
Payer: MEDICARE

## 2021-07-30 PROCEDURE — 92014 COMPRE OPH EXAM EST PT 1/>: CPT

## 2021-07-30 PROCEDURE — 92235 FLUORESCEIN ANGRPH MLTIFRAME: CPT

## 2021-09-14 NOTE — ED ADULT NURSE NOTE - CAS TRG GEN SKIN CONDITION
CERTIFICATE OF       September 14, 2021      Re: Daryl Echeverria  [address] [facility] [address] [address]      This is to certify that Daryl Echeverria has been seen in the clinic today, tested negative for COVID, and can return to  9/15/21.        SIGNATURE:___________________________________________          Halle L Tez, PA-C  [facility], [facility]  [address] [address]  Dept Phone: [phone]    
Warm

## 2021-10-26 ENCOUNTER — APPOINTMENT (OUTPATIENT)
Dept: OPHTHALMOLOGY | Facility: CLINIC | Age: 76
End: 2021-10-26

## 2021-10-29 ENCOUNTER — APPOINTMENT (OUTPATIENT)
Dept: OPHTHALMOLOGY | Facility: CLINIC | Age: 76
End: 2021-10-29

## 2021-11-22 ENCOUNTER — APPOINTMENT (OUTPATIENT)
Dept: CT IMAGING | Facility: HOSPITAL | Age: 76
End: 2021-11-22
Payer: MEDICARE

## 2021-11-22 ENCOUNTER — RESULT REVIEW (OUTPATIENT)
Age: 76
End: 2021-11-22

## 2021-11-22 ENCOUNTER — OUTPATIENT (OUTPATIENT)
Dept: OUTPATIENT SERVICES | Facility: HOSPITAL | Age: 76
LOS: 1 days | End: 2021-11-22
Payer: MEDICARE

## 2021-11-22 PROCEDURE — 71250 CT THORAX DX C-: CPT

## 2021-11-22 PROCEDURE — 71250 CT THORAX DX C-: CPT | Mod: 26

## 2021-11-29 ENCOUNTER — APPOINTMENT (OUTPATIENT)
Dept: ORTHOPEDIC SURGERY | Facility: CLINIC | Age: 76
End: 2021-11-29
Payer: MEDICARE

## 2021-11-29 PROCEDURE — 99214 OFFICE O/P EST MOD 30 MIN: CPT

## 2021-11-29 PROCEDURE — 73564 X-RAY EXAM KNEE 4 OR MORE: CPT | Mod: LT

## 2021-11-29 NOTE — HISTORY OF PRESENT ILLNESS
[de-identified] : 76 year old male presents for a follow-up evaluation of left knee pain related to arthritis. His last cortisone injection was in June 2021, which he says didn't help. He takes Meloxicam, which helps, and he needs a refill for it. The patient would like to discuss a TKR and expresses concern over spinal anesthesia, as he has prior spinal anesthesia for a procedure and had to use a catheter for a few weeks. He then developed prostate cancer and had to use catheter for about a year. He also mentions that he saw an opthalmologist on Monday for a consultation for cataract surgery.

## 2021-11-29 NOTE — END OF VISIT
[FreeTextEntry3] : This note was written by Pretty Ga on 11/29/2021 acting as scribe for Dr. Terrence Del Angel M.D.\par \par I, Dr. Terrence Del Angel, have read and attest that all the information, medical decision making and discharge instructions within are true and accurate.

## 2021-11-29 NOTE — PHYSICAL EXAM
[de-identified] : RIGHT Knee\par Inspection: trace effusion, no erythema.\par Wounds: none. \par Alignment: normal.\par Palpation: medial and lateral tenderness on palpation.\par ROM: Active (in degrees): 10 degrees valgus alignment. 0-125 degrees with pain.\par Ligamentous laxity (neg): all ligaments appear stable, negative ant. drawer test, negative post. drawer test, stable to varus stress test, stable to valgus stress test, negative Lachman's test, negative pivot shift test.\par Meniscal test: negative Michelle's, negative Bobby.\par Patellofemoral Alignment Test: Q-angle-, normal.\par Muscle Test: good quad strength.\par Leg examination: calf is soft and non-tender. [de-identified] : Left knee xrays, AP, lateral, merchant, and 45 degree PA standing view taken at the office today demonstrates diffuse tricompartmental degenerative arthritis, medial joint space narrowing, bone on bone, marginal osteophytes patella sits at an appropriate height in a central position, sclerosis, peripheral osteophytes, Kellgren and Min grade 4. Laterally tracking patella.\par \par Right knee xray merchant view, taken at the office today demonstrates good joint space and a well centered patella. \par

## 2021-11-29 NOTE — DISCUSSION/SUMMARY
[de-identified] : Discussed at length the natural history of LEFT knee degenerative arthritis and reviewed non-operative and operative treatment. Due to the pain, failure of prior nonoperative treatment including injections, NSAIDs, and physiotherapy, and associated disability I recommend a LEFT total knee replacement. The risks, benefits, convalescence and alternatives were reviewed. Numerous questions were asked and answered. We did discuss implant choice and fixation, with shared decision making with the patient. Models were used as an educational tool. Surgery will be scheduled at a convenient time. Preop medical clearance. Does not want spinal anesthesia, therefore, we discussed general anesthesia. Renew prescription for Meloxicam.

## 2021-12-06 ENCOUNTER — NON-APPOINTMENT (OUTPATIENT)
Age: 76
End: 2021-12-06

## 2021-12-06 ENCOUNTER — APPOINTMENT (OUTPATIENT)
Dept: OPHTHALMOLOGY | Facility: CLINIC | Age: 76
End: 2021-12-06
Payer: MEDICARE

## 2021-12-06 PROCEDURE — 92134 CPTRZ OPH DX IMG PST SGM RTA: CPT

## 2021-12-06 PROCEDURE — 92012 INTRM OPH EXAM EST PATIENT: CPT

## 2021-12-07 ENCOUNTER — APPOINTMENT (OUTPATIENT)
Dept: OPHTHALMOLOGY | Facility: CLINIC | Age: 76
End: 2021-12-07
Payer: MEDICARE

## 2021-12-07 ENCOUNTER — NON-APPOINTMENT (OUTPATIENT)
Age: 76
End: 2021-12-07

## 2021-12-07 PROCEDURE — 92134 CPTRZ OPH DX IMG PST SGM RTA: CPT

## 2021-12-07 PROCEDURE — 92012 INTRM OPH EXAM EST PATIENT: CPT

## 2021-12-07 PROCEDURE — 92235 FLUORESCEIN ANGRPH MLTIFRAME: CPT

## 2022-01-05 ENCOUNTER — APPOINTMENT (OUTPATIENT)
Dept: INTERNAL MEDICINE | Facility: CLINIC | Age: 77
End: 2022-01-05
Payer: MEDICARE

## 2022-01-05 PROCEDURE — 99442: CPT

## 2022-01-05 NOTE — HISTORY OF PRESENT ILLNESS
[Home] : at home, [unfilled] , at the time of the visit. [Medical Office: ([facility])___] : at the medical office located in  [FreeTextEntry1] : OA [de-identified] : OA Knee Left\par - c/o severe pain that is worse w/ walking and prolonged sitting\par - pain radiates around knee joint\par - mild improvement w/ NSAIDs\par - concerned about upcoming jury duty as it will exacerbate his pain.

## 2022-01-11 ENCOUNTER — APPOINTMENT (OUTPATIENT)
Dept: OPHTHALMOLOGY | Facility: CLINIC | Age: 77
End: 2022-01-11

## 2022-01-21 NOTE — ASSESSMENT
[FreeTextEntry1] : This is a 72 y/o man with a remote but extensive smoking hx, emphysema but no official diagnosis of COPD, a hx of pulmonary nodules with a persistent cough worsened by cold air. It could be that the patient is having some reactive airway disease and may have a underlying COPD that is being worsened by cold weather. Also on the differential is potentially and inhalation type injury due to the patients exposure to different pain fumes. \par \par 1. Persistent cough\par 2. Emphysema\par 3. Pulmonary nodules\par 4. Exposure to paint fumes. \par \par Plan:\par -Firstly will have the patient complete a full set of PFTs to see if the patient has underlying COPD or any airway reversibility. The patient has no hx of childhood asthma. \par -Will obtain a non contrast chest CT to evaluate for any ILD or any acute worsening of the parenchymal lung disease that may help explain the patients symptoms. \par -The patient had b/l pulmonary nodules were all smaller than 6mm on the CT from 2009 and no evidence of pulmonary nodules on recent Xray and thus no need for current follow up.   22-Jan-2022 01:07

## 2022-02-28 ENCOUNTER — APPOINTMENT (OUTPATIENT)
Dept: INTERNAL MEDICINE | Facility: CLINIC | Age: 77
End: 2022-02-28
Payer: MEDICARE

## 2022-02-28 ENCOUNTER — NON-APPOINTMENT (OUTPATIENT)
Age: 77
End: 2022-02-28

## 2022-02-28 VITALS — SYSTOLIC BLOOD PRESSURE: 138 MMHG | HEART RATE: 74 BPM | DIASTOLIC BLOOD PRESSURE: 89 MMHG | OXYGEN SATURATION: 95 %

## 2022-02-28 DIAGNOSIS — Z01.812 ENCOUNTER FOR PREPROCEDURAL LABORATORY EXAMINATION: ICD-10-CM

## 2022-02-28 PROCEDURE — 99214 OFFICE O/P EST MOD 30 MIN: CPT | Mod: 25

## 2022-02-28 PROCEDURE — 36415 COLL VENOUS BLD VENIPUNCTURE: CPT

## 2022-02-28 PROCEDURE — 93000 ELECTROCARDIOGRAM COMPLETE: CPT

## 2022-03-01 LAB
ALBUMIN SERPL ELPH-MCNC: 3.9 G/DL
ALP BLD-CCNC: 71 U/L
ALT SERPL-CCNC: 35 U/L
ANION GAP SERPL CALC-SCNC: 8 MMOL/L
APPEARANCE: CLEAR
APTT BLD: 33.2 SEC
AST SERPL-CCNC: 34 U/L
BASOPHILS # BLD AUTO: 0.04 K/UL
BASOPHILS NFR BLD AUTO: 0.6 %
BILIRUB SERPL-MCNC: 0.5 MG/DL
BILIRUBIN URINE: NEGATIVE
BLOOD URINE: NEGATIVE
BUN SERPL-MCNC: 24 MG/DL
CALCIUM SERPL-MCNC: 9.1 MG/DL
CHLORIDE SERPL-SCNC: 104 MMOL/L
CO2 SERPL-SCNC: 28 MMOL/L
COLOR: NORMAL
CREAT SERPL-MCNC: 0.87 MG/DL
EGFR: 90 ML/MIN/1.73M2
EOSINOPHIL # BLD AUTO: 0.22 K/UL
EOSINOPHIL NFR BLD AUTO: 3.4 %
ERYTHROCYTE [SEDIMENTATION RATE] IN BLOOD BY WESTERGREN METHOD: 2 MM/HR
GLUCOSE QUALITATIVE U: NEGATIVE
GLUCOSE SERPL-MCNC: 99 MG/DL
HCT VFR BLD CALC: 42.2 %
HGB BLD-MCNC: 13.9 G/DL
IMM GRANULOCYTES NFR BLD AUTO: 0.3 %
INR PPP: 1.08 RATIO
KETONES URINE: NEGATIVE
LEUKOCYTE ESTERASE URINE: NEGATIVE
LYMPHOCYTES # BLD AUTO: 1.51 K/UL
LYMPHOCYTES NFR BLD AUTO: 23.6 %
MAN DIFF?: NORMAL
MCHC RBC-ENTMCNC: 30.8 PG
MCHC RBC-ENTMCNC: 32.9 GM/DL
MCV RBC AUTO: 93.6 FL
MONOCYTES # BLD AUTO: 0.48 K/UL
MONOCYTES NFR BLD AUTO: 7.5 %
MRSA SPEC QL CULT: NOT DETECTED
NEUTROPHILS # BLD AUTO: 4.12 K/UL
NEUTROPHILS NFR BLD AUTO: 64.6 %
NITRITE URINE: NEGATIVE
PH URINE: 7
PLATELET # BLD AUTO: 201 K/UL
POTASSIUM SERPL-SCNC: 4.5 MMOL/L
PROT SERPL-MCNC: 5.9 G/DL
PROTEIN URINE: NEGATIVE
PT BLD: 12.7 SEC
RBC # BLD: 4.51 M/UL
RBC # FLD: 11.9 %
SODIUM SERPL-SCNC: 141 MMOL/L
SPECIFIC GRAVITY URINE: 1.02
STAPH AUREUS (SA): DETECTED
UROBILINOGEN URINE: NORMAL
WBC # FLD AUTO: 6.39 K/UL

## 2022-03-01 NOTE — HISTORY OF PRESENT ILLNESS
[No Pertinent Cardiac History] : no history of aortic stenosis, atrial fibrillation, coronary artery disease, recent myocardial infarction, or implantable device/pacemaker [No Pertinent Pulmonary History] : no history of asthma, COPD, sleep apnea, or smoking [No Adverse Anesthesia Reaction] : no adverse anesthesia reaction in self or family member [Chronic Anticoagulation] : no chronic anticoagulation [Chronic Kidney Disease] : no chronic kidney disease [Diabetes] : no diabetes [(Patient denies any chest pain, claudication, dyspnea on exertion, orthopnea, palpitations or syncope)] : Patient denies any chest pain, claudication, dyspnea on exertion, orthopnea, palpitations or syncope [Good (7-10 METs)] : Good (7-10 METs) [FreeTextEntry1] : LEFT total knee replacement [FreeTextEntry2] : 3/8/2022 [FreeTextEntry3] : Dr. Del Angel [FreeTextEntry4] : h/o left knee pain, not responsive to conservative treatment, scheduled for TKR

## 2022-03-01 NOTE — ASSESSMENT
[High Risk Surgery - Intraperitoneal, Intrathoracic or Supringuinal Vascular Procedures] : High Risk Surgery - Intraperitoneal, Intrathoracic or Supringuinal Vascular Procedures - No (0) [Ischemic Heart Disease] : Ischemic Heart Disease - No (0) [Congestive Heart Failure] : Congestive Heart Failure - No (0) [Prior Cerebrovascular Accident or TIA] : Prior Cerebrovascular Accident or TIA - No (0) [Creatinine >= 2mg/dL (1 Point)] : Creatinine >= 2mg/dL - No (0) [Insulin-dependent Diabetic (1 Point)] : Insulin-dependent Diabetic - No (0) [0] : 0 , RCRI Class: I, Risk of Post-Op Cardiac Complications: 3.9%, 95% CI for Risk Estimate: 2.8% - 5.4% [Patient Optimized for Surgery] : Patient optimized for surgery [No Further Testing Recommended] : no further testing recommended [FreeTextEntry4] : LOW risk for LOW risk procedure. May proceed with elective surgery. \par Delgado: 0.0%

## 2022-03-04 RX ORDER — CELECOXIB 200 MG/1
200 CAPSULE ORAL EVERY 12 HOURS
Refills: 0 | Status: DISCONTINUED | OUTPATIENT
Start: 2022-03-09 | End: 2022-03-09

## 2022-03-04 RX ORDER — OXYCODONE HYDROCHLORIDE 5 MG/1
10 TABLET ORAL
Refills: 0 | Status: DISCONTINUED | OUTPATIENT
Start: 2022-03-08 | End: 2022-03-09

## 2022-03-04 RX ORDER — ACETAMINOPHEN 500 MG
1000 TABLET ORAL ONCE
Refills: 0 | Status: DISCONTINUED | OUTPATIENT
Start: 2022-03-08 | End: 2022-03-09

## 2022-03-04 RX ORDER — TRAMADOL HYDROCHLORIDE 50 MG/1
50 TABLET ORAL EVERY 6 HOURS
Refills: 0 | Status: DISCONTINUED | OUTPATIENT
Start: 2022-03-08 | End: 2022-03-09

## 2022-03-04 RX ORDER — ACETAMINOPHEN 500 MG
975 TABLET ORAL EVERY 8 HOURS
Refills: 0 | Status: DISCONTINUED | OUTPATIENT
Start: 2022-03-08 | End: 2022-03-09

## 2022-03-04 RX ORDER — MAGNESIUM HYDROXIDE 400 MG/1
30 TABLET, CHEWABLE ORAL DAILY
Refills: 0 | Status: DISCONTINUED | OUTPATIENT
Start: 2022-03-08 | End: 2022-03-09

## 2022-03-04 RX ORDER — PANTOPRAZOLE SODIUM 20 MG/1
40 TABLET, DELAYED RELEASE ORAL
Refills: 0 | Status: DISCONTINUED | OUTPATIENT
Start: 2022-03-08 | End: 2022-03-09

## 2022-03-04 RX ORDER — ATORVASTATIN CALCIUM 80 MG/1
40 TABLET, FILM COATED ORAL AT BEDTIME
Refills: 0 | Status: DISCONTINUED | OUTPATIENT
Start: 2022-03-08 | End: 2022-03-09

## 2022-03-04 RX ORDER — CEFAZOLIN SODIUM 1 G
2000 VIAL (EA) INJECTION EVERY 8 HOURS
Refills: 0 | Status: COMPLETED | OUTPATIENT
Start: 2022-03-08 | End: 2022-03-08

## 2022-03-04 RX ORDER — OXYCODONE HYDROCHLORIDE 5 MG/1
5 TABLET ORAL
Refills: 0 | Status: DISCONTINUED | OUTPATIENT
Start: 2022-03-08 | End: 2022-03-09

## 2022-03-04 RX ORDER — ENOXAPARIN SODIUM 100 MG/ML
40 INJECTION SUBCUTANEOUS EVERY 24 HOURS
Refills: 0 | Status: DISCONTINUED | OUTPATIENT
Start: 2022-03-09 | End: 2022-03-09

## 2022-03-04 RX ORDER — ONDANSETRON 8 MG/1
4 TABLET, FILM COATED ORAL EVERY 6 HOURS
Refills: 0 | Status: DISCONTINUED | OUTPATIENT
Start: 2022-03-08 | End: 2022-03-09

## 2022-03-04 RX ORDER — HYDROMORPHONE HYDROCHLORIDE 2 MG/ML
0.5 INJECTION INTRAMUSCULAR; INTRAVENOUS; SUBCUTANEOUS
Refills: 0 | Status: DISCONTINUED | OUTPATIENT
Start: 2022-03-08 | End: 2022-03-09

## 2022-03-04 RX ORDER — SODIUM CHLORIDE 9 MG/ML
1000 INJECTION, SOLUTION INTRAVENOUS
Refills: 0 | Status: DISCONTINUED | OUTPATIENT
Start: 2022-03-08 | End: 2022-03-09

## 2022-03-05 ENCOUNTER — LABORATORY RESULT (OUTPATIENT)
Age: 77
End: 2022-03-05

## 2022-03-07 VITALS
OXYGEN SATURATION: 99 % | HEIGHT: 69 IN | DIASTOLIC BLOOD PRESSURE: 70 MMHG | SYSTOLIC BLOOD PRESSURE: 129 MMHG | WEIGHT: 180.34 LBS | TEMPERATURE: 97 F | RESPIRATION RATE: 16 BRPM | HEART RATE: 68 BPM

## 2022-03-07 NOTE — H&P ADULT - NSHPPHYSICALEXAM_GEN_ALL_CORE
Left knee trace effusion, medial and lateral tenderness on palpation, ROM 0-120 degrees with pain and crepitus, 10 degrees valgus alignment, good quad strength, calf is soft and non-tender.

## 2022-03-07 NOTE — H&P ADULT - HISTORY OF PRESENT ILLNESS
76 y.o. M with h/o left knee severe DJD with pain, swelling, limited mobility and ambulation presents at [facility] for his Left TKR with Dr. Del Angel. Nonoperative treatment failed.

## 2022-03-07 NOTE — PATIENT PROFILE ADULT - PACKS YRS CALCULATION
Ivermectin Counseling:  Patient instructed to take medication on an empty stomach with a full glass of water.  Patient informed of potential adverse effects including but not limited to nausea, diarrhea, dizziness, itching, and swelling of the extremities or lymph nodes.  The patient verbalized understanding of the proper use and possible adverse effects of ivermectin.  All of the patient's questions and concerns were addressed. 0

## 2022-03-07 NOTE — PATIENT PROFILE ADULT - FALL HARM RISK - UNIVERSAL INTERVENTIONS
Bed in lowest position, wheels locked, appropriate side rails in place/Call bell, personal items and telephone in reach/Instruct patient to call for assistance before getting out of bed or chair/Non-slip footwear when patient is out of bed/[place] to call system/Physically safe environment - no spills, clutter or unnecessary equipment/Purposeful Proactive Rounding/Room/bathroom lighting operational, light cord in reach

## 2022-03-07 NOTE — H&P ADULT - NSICDXPASTMEDICALHX_GEN_ALL_CORE_FT
PAST MEDICAL HISTORY:  2019 novel coronavirus disease (COVID-19)     APC (atrial premature contractions)     Chest pain     H/O varicose veins bilat    History of COPD     History of memory loss     HLD (hyperlipidemia)     Mitral regurgitation tricuspid regurgitation    Osteoarthritis     Prostate CA seeds implants    Pulmonary nodules     Shortness of breath     Vitamin D insufficiency

## 2022-03-07 NOTE — H&P ADULT - NSICDXPASTSURGICALHX_GEN_ALL_CORE_FT
PAST SURGICAL HISTORY:  H/O hemorrhoidectomy X2    H/O hernia repair BILAT    History of appendectomy     History of tonsillectomy

## 2022-03-07 NOTE — H&P ADULT - NSHPLABSRESULTS_GEN_ALL_CORE
Left knee x-rays demonstrate diffuse tricompartmental DJD, lateral joint space narrowing, bone on bone, marginal osteophytes, patella sits at an appropriate height, sclerosis, laterally tracking patella, peripheral osteophytes, Kellgren  and Min grade 4.

## 2022-03-08 ENCOUNTER — INPATIENT (INPATIENT)
Facility: HOSPITAL | Age: 77
LOS: 0 days | Discharge: HOME HEALTH | DRG: 470 | End: 2022-03-09
Attending: ORTHOPAEDIC SURGERY | Admitting: ORTHOPAEDIC SURGERY
Payer: MEDICARE

## 2022-03-08 ENCOUNTER — TRANSCRIPTION ENCOUNTER (OUTPATIENT)
Age: 77
End: 2022-03-08

## 2022-03-08 ENCOUNTER — APPOINTMENT (OUTPATIENT)
Dept: ORTHOPEDIC SURGERY | Facility: HOSPITAL | Age: 77
End: 2022-03-08

## 2022-03-08 DIAGNOSIS — Z90.89 ACQUIRED ABSENCE OF OTHER ORGANS: Chronic | ICD-10-CM

## 2022-03-08 DIAGNOSIS — Z98.890 OTHER SPECIFIED POSTPROCEDURAL STATES: Chronic | ICD-10-CM

## 2022-03-08 DIAGNOSIS — E78.5 HYPERLIPIDEMIA, UNSPECIFIED: ICD-10-CM

## 2022-03-08 DIAGNOSIS — J43.9 EMPHYSEMA, UNSPECIFIED: ICD-10-CM

## 2022-03-08 DIAGNOSIS — M17.12 UNILATERAL PRIMARY OSTEOARTHRITIS, LEFT KNEE: ICD-10-CM

## 2022-03-08 DIAGNOSIS — I49.1 ATRIAL PREMATURE DEPOLARIZATION: ICD-10-CM

## 2022-03-08 DIAGNOSIS — I34.9 NONRHEUMATIC MITRAL VALVE DISORDER, UNSPECIFIED: ICD-10-CM

## 2022-03-08 DIAGNOSIS — Z90.49 ACQUIRED ABSENCE OF OTHER SPECIFIED PARTS OF DIGESTIVE TRACT: Chronic | ICD-10-CM

## 2022-03-08 DIAGNOSIS — F41.1 GENERALIZED ANXIETY DISORDER: ICD-10-CM

## 2022-03-08 PROCEDURE — 27447 TOTAL KNEE ARTHROPLASTY: CPT | Mod: LT

## 2022-03-08 PROCEDURE — 73560 X-RAY EXAM OF KNEE 1 OR 2: CPT | Mod: 26,LT

## 2022-03-08 DEVICE — STEM EXT PERSONA 14MM PLUS 30M: Type: IMPLANTABLE DEVICE | Status: FUNCTIONAL

## 2022-03-08 DEVICE — ZIMMER/NEXGEN SMOOTH PIN 3.2X75MM: Type: IMPLANTABLE DEVICE | Status: FUNCTIONAL

## 2022-03-08 DEVICE — ZIMMER/NEXGEN HEX HEAD SCREW 3.5MM: Type: IMPLANTABLE DEVICE | Status: FUNCTIONAL

## 2022-03-08 DEVICE — CEMENT PALACOS R: Type: IMPLANTABLE DEVICE | Status: FUNCTIONAL

## 2022-03-08 DEVICE — IMPLANTABLE DEVICE: Type: IMPLANTABLE DEVICE | Status: FUNCTIONAL

## 2022-03-08 DEVICE — STEM TIB PSN 5 DEG SZ G L: Type: IMPLANTABLE DEVICE | Status: FUNCTIONAL

## 2022-03-08 DEVICE — ZIMMER FEMALE HEX SCREW MAGNETIC 2.5MM X 25MM: Type: IMPLANTABLE DEVICE | Status: FUNCTIONAL

## 2022-03-08 DEVICE — COMP FEM PERSONA LT PS CMT CCR STD SZ 12: Type: IMPLANTABLE DEVICE | Status: FUNCTIONAL

## 2022-03-08 DEVICE — PATELLA VE 38MM: Type: IMPLANTABLE DEVICE | Status: FUNCTIONAL

## 2022-03-08 RX ORDER — ATORVASTATIN CALCIUM 80 MG/1
1 TABLET, FILM COATED ORAL
Qty: 0 | Refills: 0 | DISCHARGE

## 2022-03-08 RX ORDER — MELOXICAM 15 MG/1
1 TABLET ORAL
Qty: 0 | Refills: 0 | DISCHARGE

## 2022-03-08 RX ORDER — FAMOTIDINE 10 MG/ML
20 INJECTION INTRAVENOUS EVERY 12 HOURS
Refills: 0 | Status: DISCONTINUED | OUTPATIENT
Start: 2022-03-08 | End: 2022-03-08

## 2022-03-08 RX ORDER — CHOLECALCIFEROL (VITAMIN D3) 125 MCG
1 CAPSULE ORAL
Qty: 0 | Refills: 0 | DISCHARGE

## 2022-03-08 RX ORDER — BUPIVACAINE 13.3 MG/ML
20 INJECTION, SUSPENSION, LIPOSOMAL INFILTRATION ONCE
Refills: 0 | Status: DISCONTINUED | OUTPATIENT
Start: 2022-03-08 | End: 2022-03-09

## 2022-03-08 RX ADMIN — HYDROMORPHONE HYDROCHLORIDE 0.5 MILLIGRAM(S): 2 INJECTION INTRAMUSCULAR; INTRAVENOUS; SUBCUTANEOUS at 12:20

## 2022-03-08 RX ADMIN — OXYCODONE HYDROCHLORIDE 5 MILLIGRAM(S): 5 TABLET ORAL at 12:33

## 2022-03-08 RX ADMIN — Medication 975 MILLIGRAM(S): at 21:29

## 2022-03-08 RX ADMIN — OXYCODONE HYDROCHLORIDE 5 MILLIGRAM(S): 5 TABLET ORAL at 12:35

## 2022-03-08 RX ADMIN — HYDROMORPHONE HYDROCHLORIDE 0.5 MILLIGRAM(S): 2 INJECTION INTRAMUSCULAR; INTRAVENOUS; SUBCUTANEOUS at 11:45

## 2022-03-08 RX ADMIN — HYDROMORPHONE HYDROCHLORIDE 0.5 MILLIGRAM(S): 2 INJECTION INTRAMUSCULAR; INTRAVENOUS; SUBCUTANEOUS at 12:30

## 2022-03-08 RX ADMIN — HYDROMORPHONE HYDROCHLORIDE 0.5 MILLIGRAM(S): 2 INJECTION INTRAMUSCULAR; INTRAVENOUS; SUBCUTANEOUS at 11:25

## 2022-03-08 RX ADMIN — Medication 100 MILLIGRAM(S): at 21:29

## 2022-03-08 RX ADMIN — Medication 975 MILLIGRAM(S): at 22:41

## 2022-03-08 RX ADMIN — OXYCODONE HYDROCHLORIDE 10 MILLIGRAM(S): 5 TABLET ORAL at 21:28

## 2022-03-08 RX ADMIN — ATORVASTATIN CALCIUM 40 MILLIGRAM(S): 80 TABLET, FILM COATED ORAL at 21:28

## 2022-03-08 RX ADMIN — Medication 975 MILLIGRAM(S): at 14:33

## 2022-03-08 RX ADMIN — OXYCODONE HYDROCHLORIDE 10 MILLIGRAM(S): 5 TABLET ORAL at 22:45

## 2022-03-08 RX ADMIN — Medication 100 MILLIGRAM(S): at 14:33

## 2022-03-08 RX ADMIN — Medication 975 MILLIGRAM(S): at 15:51

## 2022-03-08 NOTE — DISCHARGE NOTE PROVIDER - NSDCFUADDINST_GEN_ALL_CORE_FT
No strenuous activity, heavy lifting, driving or returning to work until cleared by MD.  You may shower - dressing is water-resistant, no soaking in bathtubs.  Remove dressing after post op day 5-7, then leave incision open to air. Keep incision clean and dry.  Try to have regular bowel movements, take stool softener or laxative if necessary.  May take pepcid or zantac for upset stomach.  Swelling may travel all the way down leg to foot, this is normal and will subside in a few weeks.  Follow up with Dr. Del Angel to schedule an appt, if you have staples or sutures they will be removed in office.  Contact your doctor if you experience: fever greater than 101.5, chills, chest pain, difficulty breathing, redness or excessive drainage around the incision, other concerns.  ***You are on blood thinners to prevent blood clots.  Administer one enoxaparin (lovenox) 40mg injection daily for fourteen days total after surgery.  After completion of the enoxaparin (lovenox) injections. start ecotrin (enteric coated aspirin therapy) 325mg two times daily for an additional 4 weeks. Once this is completed may resume your home dose of baby aspirin***  May take home meloxicam instead of celebrex (but not both at the same time)  Weight bearing as tolerated with rolling walker  No strenuous activity, heavy lifting, driving or returning to work until cleared by MD.  You may shower - dressing is water-resistant, no soaking in bathtubs.  Remove dressing after post op day 10, then leave incision open to air. Keep incision clean and dry.  Try to have regular bowel movements, take stool softener or laxative if necessary.  Swelling may travel all the way down leg to foot, this is normal and will subside in a few weeks.  Call to schedule an appt with Dr. Del Angel for follow up, if you have staples or sutures they will be removed in office.  Contact your doctor if you experience: fever greater than 101.5, chills, chest pain, difficulty breathing, redness or excessive drainage around the incision, other concerns.  Follow up with your primary care provider.

## 2022-03-08 NOTE — PHYSICAL THERAPY INITIAL EVALUATION ADULT - HEALTH SCREEN CRITERIA
Subjective:     Patient ID: Megan Logan     HPI  3 y.o. male presents with cough and fever. Brother was sick first and then Celestino started with cough/congestion/runny nose right after. Started about 3 days ago for him. Fever Tmax 104.6 (they always run higher fevers when they get sick). He's not eating or drinking much. Acts like he wants to eat but just stops. No v/d. No rashes. No known flu or COVID exposures. Review of Systems    Objective:   Physical Exam  Vitals and nursing note reviewed. Constitutional:       General: He is active. Appearance: He is well-developed. Comments: Looks like he doesn't feel great but non-toxic, cooperative with exam   HENT:      Head: Normocephalic. Ears:      Comments: Small serous fluid at base of R TM, L TM builging and erythematous     Nose: Congestion and rhinorrhea present. Mouth/Throat:      Mouth: Mucous membranes are moist.      Pharynx: Oropharynx is clear. Eyes:      Extraocular Movements: Extraocular movements intact. Pupils: Pupils are equal, round, and reactive to light. Comments: Some watery eyes but no conjunctivitis   Cardiovascular:      Rate and Rhythm: Normal rate and regular rhythm. Heart sounds: S1 normal and S2 normal. No murmur heard. Pulmonary:      Effort: Pulmonary effort is normal. No respiratory distress. Breath sounds: Normal breath sounds. No wheezing or rhonchi. Musculoskeletal:      Cervical back: Neck supple. Skin:     General: Skin is warm. Findings: No rash. Neurological:      Mental Status: He is alert. Assessment:       Diagnosis Orders   1. Left acute otitis media     2. Fever in pediatric patient  Respiratory Panel, Molecular, with COVID-19 (Restricted: peds pts or suitable admitted adults)    Respiratory Panel, Molecular, with COVID-19 (Restricted: peds pts or suitable admitted adults)   3. Acute SAMEER (middle ear effusion), right     4.  Acute URI          Plan:
yes

## 2022-03-08 NOTE — DISCHARGE NOTE PROVIDER - NSDCACTIVITY_GEN_ALL_CORE
Do not drive or operate machinery/Do not make important decisions/No heavy lifting/straining Do not drive or operate machinery/Showering allowed/Stairs allowed/Walking - Indoors allowed/No heavy lifting/straining/Walking - Outdoors allowed/Follow Instructions Provided by your Surgical Team

## 2022-03-08 NOTE — DISCHARGE NOTE PROVIDER - NSDCHC_MEDRECSTATUS_GEN_ALL_CORE
Final Anesthesia Post-op Assessment    Patient: Agustin Suggs  Procedure(s) Performed: PARS PLANA VITRECTOMY, MEMBRANE PEEL, AIRFLUID EXCHANGE SF6 GAS  RIGHT EYE 25 GAUGE VALVED - RIGHT  Anesthesia type: MAC    Vitals Value Taken Time   Temp 36 °C (96.8 °F) 06/16/21 0954   Pulse 61 06/16/21 0954   Resp 18 06/16/21 0954   SpO2 99 % 06/16/21 0954   /66 06/16/21 0954         Patient Location: Phase II  Post-op Vital Signs:stable  Level of Consciousness: awake and alert  Respiratory Status: spontaneous ventilation  Cardiovascular stable  Hydration: euvolemic  Pain Management: adequately controlled  Handoff: Handoff to receiving nurse was performed and questions were answered  Vomiting: none  Nausea: None  Airway Patency:patent  Post-op Assessment: no complications and patient tolerated procedure well with no complications      No complications documented.    Admission Reconciliation is Completed  Discharge Reconciliation is Not Complete Admission Reconciliation is Completed  Discharge Reconciliation is Completed

## 2022-03-08 NOTE — DISCHARGE NOTE PROVIDER - NSDCCPTREATMENT_GEN_ALL_CORE_FT
PRINCIPAL PROCEDURE  Procedure: Left total knee replacement  Findings and Treatment:        PRINCIPAL PROCEDURE  Procedure: Left total knee replacement  Findings and Treatment: miles

## 2022-03-08 NOTE — DISCHARGE NOTE PROVIDER - NSDCHHNEEDSERVICE_GEN_ALL_CORE
Rehabilitation services Medication teaching and assessment/Observation and assessment/Rehabilitation services/Teaching and training/Wound care and assessment

## 2022-03-08 NOTE — DISCHARGE NOTE PROVIDER - NSDCMRMEDTOKEN_GEN_ALL_CORE_FT
aspirin 81 mg oral delayed release tablet: 1 tab(s) orally once a day  atorvastatin 40 mg oral tablet: 1 tab(s) orally once a day  meloxicam 15 mg oral tablet: 1 tab(s) orally once a day   atorvastatin 40 mg oral tablet: 1 tab(s) orally once a day  CeleBREX 200 mg oral capsule: 1 cap(s) orally 2 times a day   Lovenox 40 mg/0.4 mL injectable solution: 40 milligram(s) subcutaneously once a day   meloxicam 15 mg oral tablet: 1 tab(s) orally once a day  oxyCODONE 5 mg oral tablet: 1-2 tab(s) orally every 4 hours, As Needed -for severe pain MDD:6   Tylenol Extra Strength 500 mg oral tablet: 2 tab(s) orally every 8 hours, As Needed -for mild to moderate pain MDD:4000mg

## 2022-03-08 NOTE — DISCHARGE NOTE PROVIDER - HOSPITAL COURSE
Admit to Orthopaedics for left total knee replacement   Perioperative Antibiotics  DVT prophylaxis  Physical Therapy  Pain Management Admit to Orthopaedics 3/8  left total knee replacement 3/8   Perioperative Antibiotics  DVT prophylaxis  Physical Therapy  Pain control

## 2022-03-08 NOTE — DISCHARGE NOTE PROVIDER - NSDCFUSCHEDAPPT_GEN_ALL_CORE_FT
KAUSHIK SIMEON ; 03/22/2022 ; NPP Urology [address]  KAUSHIK SIMEON ; 03/28/2022 ; NPP OrthoSurg [address] [facility]

## 2022-03-08 NOTE — DISCHARGE NOTE PROVIDER - NSDCCPCAREPLAN_GEN_ALL_CORE_FT
PRINCIPAL DISCHARGE DIAGNOSIS  Diagnosis: Left knee DJD  Assessment and Plan of Treatment:        PRINCIPAL DISCHARGE DIAGNOSIS  Diagnosis: Left knee DJD  Assessment and Plan of Treatment: improvement s/p Left TKR

## 2022-03-08 NOTE — PHYSICAL THERAPY INITIAL EVALUATION ADULT - PERTINENT HX OF CURRENT PROBLEM, REHAB EVAL
76 y.o. M with h/o left knee severe DJD with pain, swelling, limited mobility and ambulation presents at [facility] for his Left TKR with Dr. Del Angel. Nonoperative treatment failed.

## 2022-03-08 NOTE — CONSULT NOTE ADULT - SUBJECTIVE AND OBJECTIVE BOX
HPI:  76 year old male  with  left knee severe Osteoarthritis with moderate pain, swelling, limited mobility and ambulation presents at [facility] for his Left TKR with Dr. Del Angel. Nonoperative treatment failed. X rays confirm diagnosis.  Symptoms worse with stairs and after prolonged immobility and increased over years.    PAST MEDICAL & SURGICAL HISTORY:  Prostate CA seeds implants  hypovitaminosis D  Osteoarthritis  HLD (hyperlipidemia)  Pulmonary nodules  Vitamin D insufficiency  APC (atrial premature contractions)  History of COPD  History of memory loss  Mitral regurgitation  tricuspid regurgitation  Shortness of breath  H/O varicose veins bilat  2019 novel coronavirus disease (COVID-19)  Chest pain  Left knee DJD  History of appendectomy  History of tonsillectomy  H/O hernia repair BILAT  H/O hemorrhoidectomy X2    REVIEW OF SYSTEMS    General: normal	  Skin/Breast: normal  Ophthalmologic: negative  ENMT:	normal  Respiratory and Thorax: normal  Cardiovascular:	normal  Gastrointestinal:	normal  Genitourinary:	normal  Musculoskeletal:	 left knee swelling   Neurological:	decreased short term memory  Psychiatric:	anxiety  Hematology/Lymphatics:	negative  Endocrine:	negative  Allergic/Immunologic:	negative      MEDICATIONS     aspirin 81 mg oral delayed release tablet 1 tab(s) orally once a day  · 	meloxicam 15 mg oral tablet 1 tab(s) orally once a day  · 	atorvastatin 40 mg oral tablet 1 tab(s) orally once a day  · 	Vitamin D3 25 mcg (1000 intl units) oral capsule 1 cap(s) orally once a day    Allergies    No Known Allergies    SOCIAL HISTORY: no cigs no alcohol    FAMILY HISTORY: non contributory    PHYSICAL EXAM:     Vital Signs Last 24 Hrs  T(C): --  T(F): --98  HR: --74  BP: --130/80  BP(mean): --  RR: --16  SpO2: --95    Constitutional: WDWNM in NAD  Eyes: conj pink  ENMT: negative  Neck: supple  Breasts: not examined   Back: negative  Respiratory: clear to P&A  Cardiovascular: no MRGT or H  Gastrointestinal: normal bowel sounds  Genitourinary: neg  Rectal: not examined  Extremities: normal  Vascular: normal  Neurological: normal  Skin: negative  Lymph Nodes: negative  Musculoskeletal:   decreased ROM  left knee  Psychiatric: anxiety      LABS:

## 2022-03-08 NOTE — DISCHARGE NOTE PROVIDER - CARE PROVIDER_API CALL
Terrence Del Angel)  Orthopaedic Surgery  [address] [place] [address]  Phone: [phone]  Fax: [phone]  Follow Up Time:    Terrence Del Angel)  Orthopaedic Surgery  [address] [place] [address]  Phone: [phone]  Fax: [phone]  Follow Up Time: 2 weeks

## 2022-03-08 NOTE — PHYSICAL THERAPY INITIAL EVALUATION ADULT - ADDITIONAL COMMENTS
Pt lives alone with in an elevator apartment [address] and will have someone stay with him for 2 weeks post d/c. Pt denies use of AD/DME prior to admisson.

## 2022-03-08 NOTE — BRIEF OPERATIVE NOTE - PRIMARY SURGEON
----- Message from Laine Fang sent at 4/27/2017  9:11 AM CDT -----  Contact: patient [phone]  Pt asked to speak with the nurse. She c/o hot flashes and is concerned about possible thyroid problems. Can you order thyroid tests? Pt would like to know if this can be done today when home health comes.   Dr Terrence Del Angel MD

## 2022-03-09 ENCOUNTER — TRANSCRIPTION ENCOUNTER (OUTPATIENT)
Age: 77
End: 2022-03-09

## 2022-03-09 VITALS
RESPIRATION RATE: 18 BRPM | HEART RATE: 75 BPM | OXYGEN SATURATION: 96 % | DIASTOLIC BLOOD PRESSURE: 62 MMHG | SYSTOLIC BLOOD PRESSURE: 124 MMHG | TEMPERATURE: 98 F

## 2022-03-09 LAB
ANION GAP SERPL CALC-SCNC: 6 MMOL/L — SIGNIFICANT CHANGE UP (ref 5–17)
BUN SERPL-MCNC: 17 MG/DL — SIGNIFICANT CHANGE UP (ref 7–23)
CALCIUM SERPL-MCNC: 8.9 MG/DL — SIGNIFICANT CHANGE UP (ref 8.4–10.5)
CHLORIDE SERPL-SCNC: 103 MMOL/L — SIGNIFICANT CHANGE UP (ref 96–108)
CO2 SERPL-SCNC: 29 MMOL/L — SIGNIFICANT CHANGE UP (ref 22–31)
CREAT SERPL-MCNC: 0.87 MG/DL — SIGNIFICANT CHANGE UP (ref 0.5–1.3)
EGFR: 90 ML/MIN/1.73M2 — SIGNIFICANT CHANGE UP
GLUCOSE SERPL-MCNC: 117 MG/DL — HIGH (ref 70–99)
HCT VFR BLD CALC: 39.2 % — SIGNIFICANT CHANGE UP (ref 39–50)
HCV AB S/CO SERPL IA: 0.04 S/CO — SIGNIFICANT CHANGE UP
HCV AB SERPL-IMP: SIGNIFICANT CHANGE UP
HGB BLD-MCNC: 12.7 G/DL — LOW (ref 13–17)
MCHC RBC-ENTMCNC: 30.1 PG — SIGNIFICANT CHANGE UP (ref 27–34)
MCHC RBC-ENTMCNC: 32.4 GM/DL — SIGNIFICANT CHANGE UP (ref 32–36)
MCV RBC AUTO: 92.9 FL — SIGNIFICANT CHANGE UP (ref 80–100)
NRBC # BLD: 0 /100 WBCS — SIGNIFICANT CHANGE UP (ref 0–0)
PLATELET # BLD AUTO: 183 K/UL — SIGNIFICANT CHANGE UP (ref 150–400)
POTASSIUM SERPL-MCNC: 3.8 MMOL/L — SIGNIFICANT CHANGE UP (ref 3.5–5.3)
POTASSIUM SERPL-SCNC: 3.8 MMOL/L — SIGNIFICANT CHANGE UP (ref 3.5–5.3)
RBC # BLD: 4.22 M/UL — SIGNIFICANT CHANGE UP (ref 4.2–5.8)
RBC # FLD: 11.9 % — SIGNIFICANT CHANGE UP (ref 10.3–14.5)
SODIUM SERPL-SCNC: 138 MMOL/L — SIGNIFICANT CHANGE UP (ref 135–145)
WBC # BLD: 11.58 K/UL — HIGH (ref 3.8–10.5)
WBC # FLD AUTO: 11.58 K/UL — HIGH (ref 3.8–10.5)

## 2022-03-09 PROCEDURE — C1713: CPT

## 2022-03-09 PROCEDURE — 85027 COMPLETE CBC AUTOMATED: CPT

## 2022-03-09 PROCEDURE — 97161 PT EVAL LOW COMPLEX 20 MIN: CPT

## 2022-03-09 PROCEDURE — 36415 COLL VENOUS BLD VENIPUNCTURE: CPT

## 2022-03-09 PROCEDURE — 86803 HEPATITIS C AB TEST: CPT

## 2022-03-09 PROCEDURE — 97530 THERAPEUTIC ACTIVITIES: CPT

## 2022-03-09 PROCEDURE — 73560 X-RAY EXAM OF KNEE 1 OR 2: CPT

## 2022-03-09 PROCEDURE — C1776: CPT

## 2022-03-09 PROCEDURE — 97116 GAIT TRAINING THERAPY: CPT

## 2022-03-09 PROCEDURE — 80048 BASIC METABOLIC PNL TOTAL CA: CPT

## 2022-03-09 RX ORDER — OXYCODONE HYDROCHLORIDE 5 MG/1
1 TABLET ORAL
Qty: 30 | Refills: 0
Start: 2022-03-09

## 2022-03-09 RX ORDER — ENOXAPARIN SODIUM 100 MG/ML
40 INJECTION SUBCUTANEOUS
Qty: 13 | Refills: 0
Start: 2022-03-09 | End: 2022-03-21

## 2022-03-09 RX ORDER — ACETAMINOPHEN 500 MG
2 TABLET ORAL
Qty: 100 | Refills: 0
Start: 2022-03-09

## 2022-03-09 RX ORDER — ASPIRIN/CALCIUM CARB/MAGNESIUM 324 MG
1 TABLET ORAL
Qty: 0 | Refills: 0 | DISCHARGE

## 2022-03-09 RX ORDER — CELECOXIB 200 MG/1
1 CAPSULE ORAL
Qty: 28 | Refills: 0
Start: 2022-03-09 | End: 2022-03-22

## 2022-03-09 RX ORDER — POLYETHYLENE GLYCOL 3350 17 G/17G
17 POWDER, FOR SOLUTION ORAL ONCE
Refills: 0 | Status: COMPLETED | OUTPATIENT
Start: 2022-03-09 | End: 2022-03-09

## 2022-03-09 RX ADMIN — CELECOXIB 200 MILLIGRAM(S): 200 CAPSULE ORAL at 05:25

## 2022-03-09 RX ADMIN — Medication 975 MILLIGRAM(S): at 14:02

## 2022-03-09 RX ADMIN — OXYCODONE HYDROCHLORIDE 10 MILLIGRAM(S): 5 TABLET ORAL at 10:25

## 2022-03-09 RX ADMIN — Medication 975 MILLIGRAM(S): at 13:08

## 2022-03-09 RX ADMIN — POLYETHYLENE GLYCOL 3350 17 GRAM(S): 17 POWDER, FOR SOLUTION ORAL at 09:11

## 2022-03-09 RX ADMIN — PANTOPRAZOLE SODIUM 40 MILLIGRAM(S): 20 TABLET, DELAYED RELEASE ORAL at 05:25

## 2022-03-09 RX ADMIN — ENOXAPARIN SODIUM 40 MILLIGRAM(S): 100 INJECTION SUBCUTANEOUS at 08:23

## 2022-03-09 RX ADMIN — OXYCODONE HYDROCHLORIDE 10 MILLIGRAM(S): 5 TABLET ORAL at 11:20

## 2022-03-09 RX ADMIN — Medication 975 MILLIGRAM(S): at 05:25

## 2022-03-09 NOTE — DISCHARGE NOTE NURSING/CASE MANAGEMENT/SOCIAL WORK - PATIENT PORTAL LINK FT
You can access the FollowMyHealth Patient Portal offered by [facility] by registering at the following website: [email or web address]. By joining [facility]’s FollowMyHealth portal, you will also be able to view your health information using other applications (apps) compatible with our system.

## 2022-03-09 NOTE — OCCUPATIONAL THERAPY INITIAL EVALUATION ADULT - GENERAL OBSERVATIONS, REHAB EVAL
Pt received semi-supine in bed, GERMAN Holden at bedside, +heplock, +L knee ACE wrap, in NAD and agreeable to OT.

## 2022-03-09 NOTE — OCCUPATIONAL THERAPY INITIAL EVALUATION ADULT - LEVEL OF INDEPENDENCE: DRESS LOWER BODY, OT EVAL
don/doff B socks at EOB, verbally instructed pt on how to don/doff pants with pt able to re-iterate steps correctly/supervision

## 2022-03-09 NOTE — OCCUPATIONAL THERAPY INITIAL EVALUATION ADULT - PERTINENT HX OF CURRENT PROBLEM, REHAB EVAL
76 y.o. M with h/o left knee severe DJD with pain, swelling, limited mobility and ambulation presents at [facility] for his Left TKR with Dr. Del Angel. Nonoperative treatment failed.

## 2022-03-09 NOTE — OCCUPATIONAL THERAPY INITIAL EVALUATION ADULT - RANGE OF MOTION EXAMINATION, LOWER EXTREMITY
limited L knee flex/ext 2/2 surgical procedure/Right LE Active ROM was WFL   (within functional limits)

## 2022-03-09 NOTE — OCCUPATIONAL THERAPY INITIAL EVALUATION ADULT - MODIFIED CLINICAL TEST OF SENSORY INTEGRATION IN BALANCE TEST
Pt able to ambulate household distances with modified independence using RW ~75'x2, 1 LOB noted when pt attempting to take step without RW pt able to self recover with supervision.

## 2022-03-09 NOTE — PROGRESS NOTE ADULT - ASSESSMENT
75 y/o M s/p L TKA POD#1  -Complete post op Abx  -DVT ppx: Lovenox  -WBAT on LLE  -Continue c PT/OT  -Pain control  -Hemovac removed this a.m. Maintain dressing c/d/i  -Dispo to home p PT

## 2022-03-09 NOTE — OCCUPATIONAL THERAPY INITIAL EVALUATION ADULT - NS ASR FOLLOW COMMAND OT EVAL
Refill Approved    Rx renewed per Medication Renewal Policy. Medication was last renewed on 11/7/17.    Mehnaz Telles, [facility] Triage/Med Refill 3/24/2019     Requested Prescriptions   Pending Prescriptions Disp Refills     omeprazole (PRILOSEC) 20 MG capsule [Pharmacy Med Name: OMEPRAZOLE 20MG CAPSULES] 90 capsule 0     Sig: TAKE 1 CAPSULE(20 MG) BY MOUTH DAILY BEFORE BREAKFAST    GI Medications Refill Protocol Passed - 3/24/2019 11:15 AM       Passed - PCP or prescribing provider visit in last 12 or next 3 months.    Last office visit with prescriber/PCP: 5/31/2018 Ariela Malcolm DO OR same dept: 5/31/2018 Ariela Malcolm DO OR same specialty: 5/31/2018 Ariela Malcolm DO  Last physical: Visit date not found Last MTM visit: Visit date not found   Next visit within 3 mo: Visit date not found  Next physical within 3 mo: Visit date not found  Prescriber OR PCP: Ariela Malcolm DO  Last diagnosis associated with med order: 1. GERD (gastroesophageal reflux disease)  - omeprazole (PRILOSEC) 20 MG capsule [Pharmacy Med Name: OMEPRAZOLE 20MG CAPSULES]; TAKE 1 CAPSULE(20 MG) BY MOUTH DAILY BEFORE BREAKFAST  Dispense: 90 capsule; Refill: 0    If protocol passes may refill for 12 months if within 3 months of last provider visit (or a total of 15 months).                          100% of the time/able to follow multistep instructions

## 2022-03-09 NOTE — OCCUPATIONAL THERAPY INITIAL EVALUATION ADULT - ADDITIONAL COMMENTS
Pt lives alone in a 2nd floor apartment with elevator access. Prior to admit, pt was independent with all ADLs and mobility, did not require any DME or AD. Pt states ex-wife will be coming to stay with him upon d/c home for ~1-2 weeks. Pt lives alone in a 2nd floor apartment with elevator access. Prior to admit, pt was independent with all ADLs and mobility, did not require any DME or AD. Pt states ex-wife will be coming to stay with him upon d/c home for ~1-2 weeks. Pt has a walk-in shower, purchased a shower chair prior to surgery.

## 2022-03-09 NOTE — OCCUPATIONAL THERAPY INITIAL EVALUATION ADULT - DIAGNOSIS, OT EVAL
Pt is POD 1 L TKR presents close to functional baseline with pt able to complete ADLs, transfer, and mobility with modified independence using RW. Pt demo good safety awareness, no further acute OT needs, pt will be d/c from OT at this time.

## 2022-03-09 NOTE — DISCHARGE NOTE NURSING/CASE MANAGEMENT/SOCIAL WORK - NSDCPEFALRISK_GEN_ALL_CORE
For information on Fall & Injury Prevention, visit: [email or web address] OR  [email or web address] OR  https://www.cdc.gov/steadi/patient.html

## 2022-03-11 RX ORDER — HYDROCODONE BITARTRATE AND ACETAMINOPHEN 7.5; 3 MG/1; MG/1
7.5-3 TABLET ORAL
Qty: 60 | Refills: 0 | Status: DISCONTINUED | COMMUNITY
Start: 2022-03-11 | End: 2022-03-11

## 2022-03-17 DIAGNOSIS — I49.1 ATRIAL PREMATURE DEPOLARIZATION: ICD-10-CM

## 2022-03-17 DIAGNOSIS — J43.9 EMPHYSEMA, UNSPECIFIED: ICD-10-CM

## 2022-03-17 DIAGNOSIS — I05.9 RHEUMATIC MITRAL VALVE DISEASE, UNSPECIFIED: ICD-10-CM

## 2022-03-17 DIAGNOSIS — M17.12 UNILATERAL PRIMARY OSTEOARTHRITIS, LEFT KNEE: ICD-10-CM

## 2022-03-17 DIAGNOSIS — F41.8 OTHER SPECIFIED ANXIETY DISORDERS: ICD-10-CM

## 2022-03-17 DIAGNOSIS — Z79.82 LONG TERM (CURRENT) USE OF ASPIRIN: ICD-10-CM

## 2022-03-17 DIAGNOSIS — Z86.16 PERSONAL HISTORY OF COVID-19: ICD-10-CM

## 2022-03-17 DIAGNOSIS — Z20.828 CONTACT WITH AND (SUSPECTED) EXPOSURE TO OTHER VIRAL COMMUNICABLE DISEASES: ICD-10-CM

## 2022-03-17 DIAGNOSIS — N40.0 BENIGN PROSTATIC HYPERPLASIA WITHOUT LOWER URINARY TRACT SYMPTOMS: ICD-10-CM

## 2022-03-17 DIAGNOSIS — Z85.46 PERSONAL HISTORY OF MALIGNANT NEOPLASM OF PROSTATE: ICD-10-CM

## 2022-03-17 DIAGNOSIS — Z90.49 ACQUIRED ABSENCE OF OTHER SPECIFIED PARTS OF DIGESTIVE TRACT: ICD-10-CM

## 2022-03-17 DIAGNOSIS — E78.5 HYPERLIPIDEMIA, UNSPECIFIED: ICD-10-CM

## 2022-03-21 VITALS — BODY MASS INDEX: 26.66 KG/M2 | WEIGHT: 180 LBS | HEIGHT: 69 IN

## 2022-03-22 ENCOUNTER — APPOINTMENT (OUTPATIENT)
Dept: UROLOGY | Facility: CLINIC | Age: 77
End: 2022-03-22

## 2022-03-28 ENCOUNTER — APPOINTMENT (OUTPATIENT)
Dept: ORTHOPEDIC SURGERY | Facility: CLINIC | Age: 77
End: 2022-03-28
Payer: MEDICARE

## 2022-03-28 VITALS — BODY MASS INDEX: 26.66 KG/M2 | HEIGHT: 69 IN | WEIGHT: 180 LBS

## 2022-03-28 PROBLEM — Z87.09 PERSONAL HISTORY OF OTHER DISEASES OF THE RESPIRATORY SYSTEM: Chronic | Status: ACTIVE | Noted: 2022-03-07

## 2022-03-28 PROBLEM — E55.9 VITAMIN D DEFICIENCY, UNSPECIFIED: Chronic | Status: ACTIVE | Noted: 2022-03-07

## 2022-03-28 PROBLEM — U07.1 COVID-19: Chronic | Status: ACTIVE | Noted: 2022-03-07

## 2022-03-28 PROBLEM — M19.90 UNSPECIFIED OSTEOARTHRITIS, UNSPECIFIED SITE: Chronic | Status: ACTIVE | Noted: 2022-03-07

## 2022-03-28 PROBLEM — R91.8 OTHER NONSPECIFIC ABNORMAL FINDING OF LUNG FIELD: Chronic | Status: ACTIVE | Noted: 2022-03-07

## 2022-03-28 PROBLEM — Z87.898 PERSONAL HISTORY OF OTHER SPECIFIED CONDITIONS: Chronic | Status: ACTIVE | Noted: 2022-03-07

## 2022-03-28 PROBLEM — E78.5 HYPERLIPIDEMIA, UNSPECIFIED: Chronic | Status: ACTIVE | Noted: 2022-03-07

## 2022-03-28 PROBLEM — Z86.79 PERSONAL HISTORY OF OTHER DISEASES OF THE CIRCULATORY SYSTEM: Chronic | Status: ACTIVE | Noted: 2022-03-07

## 2022-03-28 PROBLEM — I34.0 NONRHEUMATIC MITRAL (VALVE) INSUFFICIENCY: Chronic | Status: ACTIVE | Noted: 2022-03-07

## 2022-03-28 PROBLEM — R07.9 CHEST PAIN, UNSPECIFIED: Chronic | Status: ACTIVE | Noted: 2022-03-07

## 2022-03-28 PROBLEM — M17.12 UNILATERAL PRIMARY OSTEOARTHRITIS, LEFT KNEE: Chronic | Status: ACTIVE | Noted: 2022-03-07

## 2022-03-28 PROBLEM — R06.02 SHORTNESS OF BREATH: Chronic | Status: ACTIVE | Noted: 2022-03-07

## 2022-03-28 PROBLEM — I49.1 ATRIAL PREMATURE DEPOLARIZATION: Chronic | Status: ACTIVE | Noted: 2022-03-07

## 2022-03-28 PROCEDURE — 99024 POSTOP FOLLOW-UP VISIT: CPT

## 2022-03-28 NOTE — PROCEDURE
[de-identified] : Observation on incision dry, clean, intact, well healed. Method staple removing kit. Suture site Cleaned with iodine swab after sutures are completely removed. Instructions Keep incision dry and clean, allowed to shower and pat site dry, do not rub dry, contact office is site becomes red, swollen, infected, or you develop a fever. \par \par

## 2022-03-28 NOTE — PROCEDURE
[de-identified] : Observation on incision dry, clean, intact, well healed. Method staple removing kit. Suture site Cleaned with iodine swab after sutures are completely removed. Instructions Keep incision dry and clean, allowed to shower and pat site dry, do not rub dry, contact office is site becomes red, swollen, infected, or you develop a fever. \par \par

## 2022-03-28 NOTE — HISTORY OF PRESENT ILLNESS
[Clean/Dry/Intact] : clean, dry and intact [Healed] : healed [Swelling] : swollen [Neuro Intact] : an unremarkable neurological exam [Vascular Intact] : ~T peripheral vascular exam normal [Negative Rosa's] : maneuvers demonstrated a negative Rosa's sign [Doing Well] : is doing well [No Sign of Infection] : is showing no signs of infection [Adequate Pain Control] : has adequate pain control [Staples Removed] : staples were removed [Chills] : no chills [Constipation] : no constipation [Diarrhea] : no diarrhea [Dysuria] : no dysuria [Fever] : no fever [Nausea] : no nausea [Vomiting] : no vomiting [Erythema] : not erythematous [Discharge] : absent of discharge [Dehiscence] : not dehisced [de-identified] : Post-op visit [de-identified] : Patient presents today for the F/U S/P Left TKR done 3 weeks ago. Patient is doing well and undergoing P.T> with improvement. Takes pain meds and DVT prophylaxis. I went over post-op care and answered all his questions. I also provided him with surgical report for his records. [de-identified] : ROM 0-90 degrees [de-identified] : Continue P.T. pain management and DVT prophylaxis. F/U in 1 month with x-rays.

## 2022-03-28 NOTE — HISTORY OF PRESENT ILLNESS
[Chills] : no chills [Constipation] : no constipation [Diarrhea] : no diarrhea [Dysuria] : no dysuria [Fever] : no fever [Nausea] : no nausea [Vomiting] : no vomiting [Clean/Dry/Intact] : clean, dry and intact [Healed] : healed [Erythema] : not erythematous [Discharge] : absent of discharge [Swelling] : swollen [Dehiscence] : not dehisced [Neuro Intact] : an unremarkable neurological exam [Vascular Intact] : ~T peripheral vascular exam normal [Negative Rosa's] : maneuvers demonstrated a negative Rosa's sign [Doing Well] : is doing well [No Sign of Infection] : is showing no signs of infection [Adequate Pain Control] : has adequate pain control [Staples Removed] : staples were removed [de-identified] : Post-op visit [de-identified] : Patient presents today for the F/U S/P Left TKR done 3 weeks ago. Patient is doing very well and undergoing P.T. with improvement. Takes pain meds Oxycodone and Aspirin for DVT prophylaxis. I went over post-op care and answered all his questions. i also provided him with surgical report for his records. [de-identified] : EROM 0-95 degrees [de-identified] : Continue P.T>, pain management and DVT prophylaxis. F/U in 1 month with x-rays.

## 2022-04-11 ENCOUNTER — APPOINTMENT (OUTPATIENT)
Dept: ORTHOPEDIC SURGERY | Facility: CLINIC | Age: 77
End: 2022-04-11
Payer: MEDICARE

## 2022-04-11 DIAGNOSIS — Z96.652 AFTERCARE FOLLOWING JOINT REPLACEMENT SURGERY: ICD-10-CM

## 2022-04-11 DIAGNOSIS — Z47.1 AFTERCARE FOLLOWING JOINT REPLACEMENT SURGERY: ICD-10-CM

## 2022-04-11 PROBLEM — Z11.59 SCREENING FOR VIRAL DISEASE: Status: ACTIVE | Noted: 2020-10-23

## 2022-04-11 PROCEDURE — 73562 X-RAY EXAM OF KNEE 3: CPT | Mod: LT

## 2022-04-11 PROCEDURE — 99024 POSTOP FOLLOW-UP VISIT: CPT

## 2022-04-11 NOTE — DISCUSSION/SUMMARY
[de-identified] : Pt is doing very well following his left TKR. Continue PT. They can continue activities as tolerated. See back in 6-8 weeks with x-rays.

## 2022-04-11 NOTE — HISTORY OF PRESENT ILLNESS
[de-identified] : 76 year old male presents for a follow-up evaluation of 5 weeks s/p a left TKR. Pt has been going for PT. He also goes to the gym and walks for exercise. Pt reports swelling and pain. He takes Meloxicam prn. Pt would like medical alert card.

## 2022-04-11 NOTE — PHYSICAL EXAM
[de-identified] : LEFT Knee\par Inspection: no effusion, minimal soft tissue swelling.\par Wounds: healed midline incision.\par Alignment: normal.\par Palpation: no specific tenderness on palpation. \par ROM: Active (in degrees): 0-120.\par Ligamentous laxity (neg): negative ant. drawer test, negative post. drawer test, stable to varus stress test, stable to valgus stress test.\par Patellofemoral Alignment Test: Q angle-, normal.\par Muscle Test: good quad strength.\par Leg examination: calf is soft and non-tender. [de-identified] : LEFT knee x-ray, AP, lateral, merchant view taken at the office today demonstrates a total knee replacement in satisfactory position and alignment. No evidence of loosening. The patella sits in a central position. \par \par RIGHT knee x-ray merchant view taken at the office today demonstrates good joint space and a well centered patella.

## 2022-04-11 NOTE — REASON FOR VISIT
[Post Operative Visit] : a post operative visit for [Artificial Knee Joint] : artificial knee joint [Knee Pain] : knee pain [Joint Replacement Surgery, Aftercare] : joint replacement surgery, aftercare

## 2022-04-11 NOTE — END OF VISIT
[FreeTextEntry3] : This note was written by Pretty Ga on 4/11/22 acting as scribe for Dr. Terrence Del Angel M.D.\par \par I, Dr. Terrence Del Angel, have read and attest that all the information, medical decision making and discharge instructions within are true and accurate.

## 2022-04-27 ENCOUNTER — APPOINTMENT (OUTPATIENT)
Dept: OTOLARYNGOLOGY | Facility: CLINIC | Age: 77
End: 2022-04-27
Payer: MEDICARE

## 2022-04-27 VITALS — TEMPERATURE: 97.8 F | BODY MASS INDEX: 26.66 KG/M2 | HEIGHT: 69 IN | WEIGHT: 180 LBS

## 2022-04-27 DIAGNOSIS — R13.10 DYSPHAGIA, UNSPECIFIED: ICD-10-CM

## 2022-04-27 DIAGNOSIS — K13.79 OTHER LESIONS OF ORAL MUCOSA: ICD-10-CM

## 2022-04-27 PROCEDURE — 99203 OFFICE O/P NEW LOW 30 MIN: CPT | Mod: 25

## 2022-04-27 PROCEDURE — 31575 DIAGNOSTIC LARYNGOSCOPY: CPT

## 2022-04-28 PROBLEM — K13.79 HYPERTROPHY OF UVULA: Status: ACTIVE | Noted: 2022-04-28

## 2022-04-28 PROBLEM — R13.10 DYSPHAGIA: Status: ACTIVE | Noted: 2022-04-28

## 2022-04-28 NOTE — PROCEDURE
[de-identified] : PROCEDURE: Flexible laryngoscopy\par SURGEON: Dr. Plasencia\par INDICATIONS: He was unable to tolerate a mirror exam. Assess for laryngopharynx pharyngeal reflux. cough. head and neck mass. \par ANESTHESIA: The patient was placed in a sitting position.  Following application of the topical anesthetic and decongestant, exam was performed with a flexible scope.  The scope was passed along the right nasal floor to the nasopharynx.  It was then passed into the region of the middle meatus, middle turbinate, and sphenoethmoid region.  An identical procedure was performed on the left side.  The following findings were noted:\par \par The nasal musoca was healthy appearing and the septum was roughly midline. The middle meatus and sphenoethmoid recesses were clear bilaterally. The nasopharynx \par \par Nasopharynx: no masses, choanae patent, no adenoid tissue\par \par Base of tongue/vallecula: no masses or asymmetry\par Pharyngeal walls: symmetrical. No masses.\par Pyriform sinuses: no lesions or pooling of secretions.\par Epiglottis: normal. No edema or lesions.\par Aryepiglottic folds: normal. No lesions. \par Vocal cords: clear and mobile. No lesions. Airway patent.\par Arytenoids: no edema or erythema. \par Interarytenoid area: no edema, erythema or lesion.\par

## 2022-04-28 NOTE — ASSESSMENT
[FreeTextEntry1] : Uvular hypertrophy.  I have reassured him that this does not represent any specific pathology.  There is no way to know why it is developed more recently.  Perhaps she has an allergy.\par We discussed options which included observation versus laser shortening of his uvula.  At this point he would like to proceed with observation.\par He appreciated the opinion that nothing more pathologic was occurring.

## 2022-04-28 NOTE — HISTORY OF PRESENT ILLNESS
[de-identified] : Initial visit.  His chief complaint is "throat".\par \par What he is describing is a very longstanding history of uvula hypertrophy.\par In particular the last year and a half this is been more uncomfortable.\par He is describing some low-grade dysphagia.\par He does not report hemoptysis, unexplained weight loss or change in voice.

## 2022-05-09 ENCOUNTER — APPOINTMENT (OUTPATIENT)
Dept: ORTHOPEDIC SURGERY | Facility: CLINIC | Age: 77
End: 2022-05-09
Payer: MEDICARE

## 2022-05-09 PROCEDURE — 99024 POSTOP FOLLOW-UP VISIT: CPT

## 2022-05-09 PROCEDURE — 73562 X-RAY EXAM OF KNEE 3: CPT | Mod: LT

## 2022-05-09 NOTE — END OF VISIT
[FreeTextEntry3] : This note was written by Pretty Ga on 5/9/22 acting as scribe for Dr. Terrence Del Angel M.D.\par \par I, Dr. Terrence Del Angel, have read and attest that all the information, medical decision making and discharge instructions within are true and accurate.

## 2022-05-09 NOTE — DISCUSSION/SUMMARY
[de-identified] : Pt is doing well following his left TKR. Continue home exercise. They can continue activities as tolerated. See back in 3 months with x-rays.

## 2022-05-09 NOTE — HISTORY OF PRESENT ILLNESS
[de-identified] : 76 year old male presents for a follow-up evaluation s/p a left TKR on March 8, 2022. This is his 3rd post-op visit. Pt states he is feeling very well. He recently completed PT. Pt goes to the gym daily. His pain is well controlled and he takes Meloxicam for his pain at this time. He states that he is back to all of his activities and has no complaints today.

## 2022-05-09 NOTE — REVIEW OF SYSTEMS
[Joint Pain] : joint pain [Negative] : Heme/Lymph [Arthralgia] : no arthralgia [Joint Stiffness] : no joint stiffness [Joint Swelling] : joint swelling

## 2022-05-09 NOTE — PHYSICAL EXAM
[de-identified] : LEFT Knee\par Inspection: no effusion or erythema.\par Wounds: healed midline incision.\par Alignment: normal.\par Palpation: no specific tenderness on palpation.\par ROM: Active (in degrees): 0-125.\par Ligamentous laxity (neg): all ligaments appear stable, negative ant. drawer test, negative post. drawer test, stable to varus stress test, stable to valgus stress test, negative Lachman's test, negative pivot shift test.\par Meniscal test: negative Michelle's, negative Bobby.\par Patellofemoral Alignment Test: Q-angle-, normal.\par Muscle Test: good quad strength.\par Leg examination: calf is soft and non-tender.\par  [de-identified] : LEFT knee x-ray, AP, lateral, merchant view taken at the office today demonstrates a total knee replacement in satisfactory position and alignment. No evidence of loosening. The patella sits in a central position. \par \par RIGHT knee x-ray merchant view taken at the office today demonstrates good joint space and a well centered patella.

## 2022-07-14 ENCOUNTER — APPOINTMENT (OUTPATIENT)
Dept: OTOLARYNGOLOGY | Facility: CLINIC | Age: 77
End: 2022-07-14

## 2022-07-14 DIAGNOSIS — K13.79 OTHER LESIONS OF ORAL MUCOSA: ICD-10-CM

## 2022-07-14 DIAGNOSIS — G47.9 SLEEP DISORDER, UNSPECIFIED: ICD-10-CM

## 2022-07-14 DIAGNOSIS — G47.33 OBSTRUCTIVE SLEEP APNEA (ADULT) (PEDIATRIC): ICD-10-CM

## 2022-07-14 DIAGNOSIS — R06.83 SNORING: ICD-10-CM

## 2022-07-14 DIAGNOSIS — R68.89 OTHER GENERAL SYMPTOMS AND SIGNS: ICD-10-CM

## 2022-07-14 PROCEDURE — 99213 OFFICE O/P EST LOW 20 MIN: CPT | Mod: 25

## 2022-07-14 PROCEDURE — 31575 DIAGNOSTIC LARYNGOSCOPY: CPT

## 2022-07-14 RX ORDER — MELOXICAM 7.5 MG/1
7.5 TABLET ORAL
Qty: 30 | Refills: 1 | Status: COMPLETED | COMMUNITY
Start: 2020-07-13 | End: 2022-07-14

## 2022-07-14 RX ORDER — OXYCODONE 5 MG/1
5 TABLET ORAL
Qty: 40 | Refills: 0 | Status: COMPLETED | COMMUNITY
Start: 2022-03-14 | End: 2022-07-14

## 2022-07-14 RX ORDER — HYDROCODONE BITARTRATE AND ACETAMINOPHEN 7.5; 325 MG/1; MG/1
7.5-325 TABLET ORAL
Qty: 60 | Refills: 0 | Status: COMPLETED | COMMUNITY
Start: 2022-03-11 | End: 2022-07-14

## 2022-07-14 RX ORDER — DOCUSATE SODIUM 100 MG/1
100 CAPSULE ORAL 3 TIMES DAILY
Qty: 60 | Refills: 0 | Status: COMPLETED | COMMUNITY
Start: 2022-03-11 | End: 2022-07-14

## 2022-07-14 RX ORDER — HYDROCODONE BITARTRATE AND ACETAMINOPHEN 7.5; 325 MG/1; MG/1
7.5-325 TABLET ORAL
Qty: 42 | Refills: 0 | Status: COMPLETED | COMMUNITY
Start: 2022-03-28 | End: 2022-07-14

## 2022-07-14 RX ORDER — UBIDECARENONE/VIT E ACET 100MG-5
25 MCG CAPSULE ORAL
Qty: 90 | Refills: 3 | Status: COMPLETED | COMMUNITY
Start: 2021-04-26 | End: 2022-07-14

## 2022-07-14 RX ORDER — MELOXICAM 15 MG/1
15 TABLET ORAL DAILY
Qty: 30 | Refills: 1 | Status: COMPLETED | COMMUNITY
Start: 2021-06-25 | End: 2022-07-14

## 2022-07-14 RX ORDER — TADALAFIL 20 MG/1
20 TABLET ORAL
Qty: 30 | Refills: 2 | Status: COMPLETED | COMMUNITY
Start: 2020-06-05 | End: 2022-07-14

## 2022-07-14 RX ORDER — POLYETHYLENE GLYCOL 3350 17 G/17G
17 POWDER, FOR SOLUTION ORAL DAILY
Qty: 30 | Refills: 0 | Status: COMPLETED | COMMUNITY
Start: 2022-03-15 | End: 2022-07-14

## 2022-07-14 RX ORDER — POLYETHYLENE GLYCOL 3350 17 G/17G
17 POWDER, FOR SOLUTION ORAL TWICE DAILY
Qty: 10 | Refills: 0 | Status: COMPLETED | COMMUNITY
Start: 2022-03-11 | End: 2022-07-14

## 2022-07-25 LAB — SARS-COV-2 N GENE NPH QL NAA+PROBE: DETECTED

## 2022-07-25 NOTE — REASON FOR VISIT
[Initial Consultation] : an initial consultation for [FreeTextEntry2] : Persistent snoring, sleep discomfort and throat clearing for the past couple of years.   Patient states his level of severity is a level 7 out of 10 and it occurs constant.  Patient states nothing helps to improve or worsens his Persistent snoring, sleep discomfort and throat clearing for the past couple of years.

## 2022-07-25 NOTE — CONSULT LETTER
[Dear  ___] : Dear  [unfilled], [Consult Letter:] : I had the pleasure of evaluating your patient, [unfilled]. [Please see my note below.] : Please see my note below. [Consult Closing:] : Thank you very much for allowing me to participate in the care of this patient.  If you have any questions, please do not hesitate to contact me. [Sincerely,] : Sincerely, [DrFederica  ___] : Dr. WU [FreeTextEntry3] : Javier Farrell MD, FACS\par Professor of Otolaryngology, [employer] at [facility]\par Director, [facility], Department of Otolaryngology, [facility]\par , Head & Neck Service Line, [facility]\par

## 2022-07-25 NOTE — HISTORY OF PRESENT ILLNESS
[de-identified] : 77 years old male patient with history of Persistent snoring, sleep discomfort and throat clearing for the past couple of years.   Patient is present today in the office at the request of Dr. Stanton Hartman.  Patient is present today in the office with Elongated Uvula.  Deviated Nasal Septum.  Turbinate Hypertrophy.  Patient C/O Loud snoring. Observed episodes of stopped breathing during sleep. Abrupt awakenings accompanied by gasping or choking

## 2022-07-25 NOTE — PHYSICAL EXAM
[] : septum deviated bilaterally [Normal] : no abnormal secretions [de-identified] : Elongated Uvula.  Deviated Nasal Septum.  Turbinate Hypertrophy [de-identified] : Elongated Uvula.  Deviated Nasal Septum.  Turbinate Hypertrophy

## 2022-08-01 ENCOUNTER — APPOINTMENT (OUTPATIENT)
Dept: ORTHOPEDIC SURGERY | Facility: CLINIC | Age: 77
End: 2022-08-01

## 2022-08-01 PROCEDURE — 99213 OFFICE O/P EST LOW 20 MIN: CPT

## 2022-08-01 PROCEDURE — 73562 X-RAY EXAM OF KNEE 3: CPT | Mod: LT

## 2022-08-01 NOTE — HISTORY OF PRESENT ILLNESS
[de-identified] : 77 year old male presents for a follow-up evaluation of his left TKR on March 8th, 2022. He would like to discuss his swelling in his left knee, which he is concerned about. He uses ice, elevation, and Meloxicam. He also exercises on his own at the gym. He has good ROM. He is also travelling in September. The patient would like to discuss their symptoms and treatment plan with Dr. Del Angel today.

## 2022-08-01 NOTE — END OF VISIT
[FreeTextEntry3] : This note was written by Pretty Ga on 8/1/22 acting as scribe for Dr. Terrence Del Angel M.D.\par \par I, Dr. Terrence Del Angel, have read and attest that all the information, medical decision making and discharge instructions within are true and accurate.

## 2022-08-01 NOTE — DISCUSSION/SUMMARY
[de-identified] : The patient's left TKR is doing well. I reassured him that his swelling is not unusual. I reviewed x-ray films with them. They can continue activities as tolerated. See back in 6 months.

## 2022-08-01 NOTE — PHYSICAL EXAM
[de-identified] : LEFT Knee\par Inspection: trace effusion, no erythema.\par Wounds: healed midline incision.\par Alignment: normal.\par Palpation: no specific tenderness on palpation.\par ROM: Active (in degrees): 0-125.\par Ligamentous laxity (neg): all ligaments appear stable, negative ant. drawer test, negative post. drawer test, stable to varus stress test, stable to valgus stress test, negative Lachman's test, negative pivot shift test.\par Meniscal test: negative Michelle's, negative Bobby.\par Patellofemoral Alignment Test: Q-angle-, normal.\par Muscle Test: good quad strength.\par Leg examination: calf is soft and non-tender. [de-identified] : LEFT knee x-ray, AP, lateral, merchant view taken at the office today demonstrates a total knee replacement in satisfactory position and alignment. No evidence of loosening. The patella sits in a central position. \par \par RIGHT knee x-ray merchant view taken at the office today demonstrates patella at an appropriate height and central position and joint space maintained.

## 2022-08-17 ENCOUNTER — APPOINTMENT (OUTPATIENT)
Dept: OTOLARYNGOLOGY | Facility: CLINIC | Age: 77
End: 2022-08-17

## 2023-02-06 ENCOUNTER — APPOINTMENT (OUTPATIENT)
Dept: ORTHOPEDIC SURGERY | Facility: CLINIC | Age: 78
End: 2023-02-06

## 2023-02-14 ENCOUNTER — APPOINTMENT (OUTPATIENT)
Dept: INTERNAL MEDICINE | Facility: CLINIC | Age: 78
End: 2023-02-14
Payer: MEDICARE

## 2023-02-14 ENCOUNTER — NON-APPOINTMENT (OUTPATIENT)
Age: 78
End: 2023-02-14

## 2023-02-14 VITALS
HEIGHT: 69 IN | BODY MASS INDEX: 26.81 KG/M2 | TEMPERATURE: 97.9 F | DIASTOLIC BLOOD PRESSURE: 65 MMHG | HEART RATE: 70 BPM | OXYGEN SATURATION: 98 % | SYSTOLIC BLOOD PRESSURE: 106 MMHG | WEIGHT: 181 LBS

## 2023-02-14 DIAGNOSIS — J43.9 EMPHYSEMA, UNSPECIFIED: ICD-10-CM

## 2023-02-14 DIAGNOSIS — M25.469 EFFUSION, UNSPECIFIED KNEE: ICD-10-CM

## 2023-02-14 DIAGNOSIS — Z00.00 ENCOUNTER FOR GENERAL ADULT MEDICAL EXAMINATION W/OUT ABNORMAL FINDINGS: ICD-10-CM

## 2023-02-14 DIAGNOSIS — E78.5 HYPERLIPIDEMIA, UNSPECIFIED: ICD-10-CM

## 2023-02-14 DIAGNOSIS — R94.31 ABNORMAL ELECTROCARDIOGRAM [ECG] [EKG]: ICD-10-CM

## 2023-02-14 DIAGNOSIS — M25.562 PAIN IN LEFT KNEE: ICD-10-CM

## 2023-02-14 DIAGNOSIS — R22.31 LOCALIZED SWELLING, MASS AND LUMP, RIGHT UPPER LIMB: ICD-10-CM

## 2023-02-14 DIAGNOSIS — Z01.818 ENCOUNTER FOR OTHER PREPROCEDURAL EXAMINATION: ICD-10-CM

## 2023-02-14 PROCEDURE — 36415 COLL VENOUS BLD VENIPUNCTURE: CPT

## 2023-02-14 PROCEDURE — 99397 PER PM REEVAL EST PAT 65+ YR: CPT | Mod: 25

## 2023-02-14 RX ORDER — AMOXICILLIN 875 MG/1
875 TABLET, FILM COATED ORAL
Qty: 14 | Refills: 0 | Status: COMPLETED | COMMUNITY
Start: 2022-05-16 | End: 2023-02-14

## 2023-02-14 RX ORDER — MELOXICAM 15 MG/1
15 TABLET ORAL
Qty: 30 | Refills: 2 | Status: COMPLETED | COMMUNITY
Start: 2022-05-02 | End: 2023-02-14

## 2023-02-14 RX ORDER — ENOXAPARIN SODIUM 40 MG/.4ML
40 INJECTION, SOLUTION SUBCUTANEOUS
Qty: 5 | Refills: 0 | Status: COMPLETED | COMMUNITY
Start: 2022-03-09 | End: 2023-02-14

## 2023-02-14 RX ORDER — ASPIRIN 81 MG
81 TABLET, DELAYED RELEASE (ENTERIC COATED) ORAL
Refills: 0 | Status: COMPLETED | COMMUNITY
End: 2023-02-14

## 2023-02-15 PROBLEM — R22.31 SUBCUTANEOUS NODULE OF RIGHT HAND: Status: ACTIVE | Noted: 2023-02-14

## 2023-02-15 NOTE — PHYSICAL EXAM
[No Acute Distress] : no acute distress [Normal Sclera/Conjunctiva] : normal sclera/conjunctiva [EOMI] : extraocular movements intact [Normal Outer Ear/Nose] : the outer ears and nose were normal in appearance [Normal TMs] : both tympanic membranes were normal [No Edema] : there was no peripheral edema [Normal] : normal gait, coordination grossly intact, no focal deficits and deep tendon reflexes were 2+ and symmetric [de-identified] : left knee edematous, non tender, no erythema, palpable nodule base of right lateral ring finger

## 2023-02-15 NOTE — HISTORY OF PRESENT ILLNESS
[FreeTextEntry1] : annual exam [de-identified] : left knee swelling\par - pt concerned about left knee edema 1 year after left knee surgery\par - has been reassured; that it is part of normal healing process\par - would like ortho f/u and evaluation\par \par social stressors\par - has difficulty with property in [place]\par - will be traveling back to deal with local legal affairs\par \par b/l hand pain\par - a/w swollen knuckles and fingers\par - right ring finger has nodule that is painful at times\par \par HCM\par - c scope completed 2018\par - flu vax completed

## 2023-02-21 VITALS — WEIGHT: 181 LBS | HEIGHT: 69 IN | BODY MASS INDEX: 26.81 KG/M2

## 2023-02-21 DIAGNOSIS — R76.8 OTHER SPECIFIED ABNORMAL IMMUNOLOGICAL FINDINGS IN SERUM: ICD-10-CM

## 2023-02-21 DIAGNOSIS — M13.0 POLYARTHRITIS, UNSPECIFIED: ICD-10-CM

## 2023-02-21 LAB
ALBUMIN SERPL ELPH-MCNC: 4 G/DL
ALP BLD-CCNC: 85 U/L
ALT SERPL-CCNC: 30 U/L
ANA PAT FLD IF-IMP: ABNORMAL
ANA SER IF-ACNC: ABNORMAL
ANION GAP SERPL CALC-SCNC: 12 MMOL/L
APPEARANCE: CLEAR
AST SERPL-CCNC: 30 U/L
BACTERIA: NEGATIVE
BASOPHILS # BLD AUTO: 0.03 K/UL
BASOPHILS NFR BLD AUTO: 0.5 %
BILIRUB SERPL-MCNC: 0.3 MG/DL
BILIRUBIN URINE: NEGATIVE
BLOOD URINE: ABNORMAL
BUN SERPL-MCNC: 20 MG/DL
CALCIUM SERPL-MCNC: 9.2 MG/DL
CCP AB SER IA-ACNC: <8 UNITS
CHLORIDE SERPL-SCNC: 105 MMOL/L
CHOLEST SERPL-MCNC: 179 MG/DL
CO2 SERPL-SCNC: 25 MMOL/L
COLOR: YELLOW
CREAT SERPL-MCNC: 0.77 MG/DL
EGFR: 93 ML/MIN/1.73M2
EOSINOPHIL # BLD AUTO: 0.19 K/UL
EOSINOPHIL NFR BLD AUTO: 3.1 %
ERYTHROCYTE [SEDIMENTATION RATE] IN BLOOD BY WESTERGREN METHOD: 20 MM/HR
ESTIMATED AVERAGE GLUCOSE: 117 MG/DL
GLUCOSE QUALITATIVE U: NEGATIVE
GLUCOSE SERPL-MCNC: 94 MG/DL
HBA1C MFR BLD HPLC: 5.7 %
HCT VFR BLD CALC: 43.6 %
HDLC SERPL-MCNC: 61 MG/DL
HGB BLD-MCNC: 14.8 G/DL
HYALINE CASTS: 1 /LPF
IMM GRANULOCYTES NFR BLD AUTO: 0.3 %
KETONES URINE: NEGATIVE
LDLC SERPL CALC-MCNC: 100 MG/DL
LEUKOCYTE ESTERASE URINE: NEGATIVE
LYMPHOCYTES # BLD AUTO: 1.61 K/UL
LYMPHOCYTES NFR BLD AUTO: 26 %
MAN DIFF?: NORMAL
MCHC RBC-ENTMCNC: 31 PG
MCHC RBC-ENTMCNC: 33.9 GM/DL
MCV RBC AUTO: 91.2 FL
MICROSCOPIC-UA: NORMAL
MONOCYTES # BLD AUTO: 0.5 K/UL
MONOCYTES NFR BLD AUTO: 8.1 %
NEUTROPHILS # BLD AUTO: 3.84 K/UL
NEUTROPHILS NFR BLD AUTO: 62 %
NITRITE URINE: NEGATIVE
NONHDLC SERPL-MCNC: 118 MG/DL
PH URINE: 5.5
PLATELET # BLD AUTO: 226 K/UL
POTASSIUM SERPL-SCNC: 4.3 MMOL/L
PROT SERPL-MCNC: 6.3 G/DL
PROTEIN URINE: NORMAL
PSA SERPL-MCNC: <0.01 NG/ML
RBC # BLD: 4.78 M/UL
RBC # FLD: 12.3 %
RED BLOOD CELLS URINE: 13 /HPF
RF+CCP IGG SER-IMP: NEGATIVE
RHEUMATOID FACT SER QL: <10 IU/ML
SODIUM SERPL-SCNC: 142 MMOL/L
SPECIFIC GRAVITY URINE: 1.03
SQUAMOUS EPITHELIAL CELLS: 1 /HPF
TRIGL SERPL-MCNC: 93 MG/DL
TSH SERPL-ACNC: 3.52 UIU/ML
URATE SERPL-MCNC: 4 MG/DL
UROBILINOGEN URINE: NORMAL
WBC # FLD AUTO: 6.19 K/UL
WHITE BLOOD CELLS URINE: 0 /HPF

## 2023-02-27 ENCOUNTER — APPOINTMENT (OUTPATIENT)
Dept: ORTHOPEDIC SURGERY | Facility: CLINIC | Age: 78
End: 2023-02-27
Payer: MEDICARE

## 2023-02-27 ENCOUNTER — RX RENEWAL (OUTPATIENT)
Age: 78
End: 2023-02-27

## 2023-02-27 PROCEDURE — 99213 OFFICE O/P EST LOW 20 MIN: CPT

## 2023-02-27 PROCEDURE — 73562 X-RAY EXAM OF KNEE 3: CPT | Mod: LT

## 2023-02-27 NOTE — PHYSICAL EXAM
[de-identified] : Musculoskeletal: joint pain and joint swelling, but no arthralgia and no joint stiffness. \par Constitutional, Eyes, ENT, Cardiovascular, Respiratory, Gastrointestinal, Genitourinary, Integumentary, Neurological, Psychiatric, Endocrine and Heme/Lymph review of systems are otherwise negative except as noted in HPI. \par  \par LEFT Knee\par Inspection: trace effusion, no erythema.\par Wounds: healed midline incision.\par Alignment: normal.\par Palpation: no specific tenderness on palpation.\par ROM: Active (in degrees): 0-125.\par Ligamentous laxity (neg): all ligaments appear stable, negative ant. drawer test, negative post. drawer test, stable to varus stress test, stable to valgus stress test, negative Lachman's test, negative pivot shift test.\par Meniscal test: negative Michelle's, negative Bobby.\par Patellofemoral Alignment Test: Q-angle-, normal.\par Muscle Test: good quad strength.\par Leg examination: calf is soft and non-tender. \par \par  [de-identified] :  LEFT knee x-ray, AP, lateral, merchant view taken at the office today demonstrates a total knee replacement in satisfactory position and alignment. No evidence of loosening. The patella sits in a central position. \par \par RIGHT knee x-ray merchant view taken at the office today demonstrates patella at an appropriate height and central position and joint space maintained. \par \par  \par

## 2023-02-27 NOTE — HISTORY OF PRESENT ILLNESS
[de-identified] : 77 year old male presents for a follow-up evaluation of his left TKR done  on March 8 th, 2022. He is c/o still having the swelling in his knee and some pain on ambulation. He exercises on his own at the gym every day. He has great ROM. Denies taking any medications for his knee symptoms and has not been using any ice with elevation after the exercising. The patient is back to his normal activities without having any major symptoms. He is here today for his annual check-up and to discuss his residual knee symptoms.\par  \par

## 2023-02-27 NOTE — DISCUSSION/SUMMARY
[de-identified] : The patient's left TKR is doing well. He does have mild soft tissue swelling but that does not limit his ROM.  I reviewed x-ray films with them. He can modify the home exercises and use the ice with elevation. He can also take Mobic 15 mg PO daily PRN. They can continue activities as tolerated. See him back in 3 months if not better.\par  \par

## 2023-03-07 ENCOUNTER — NON-APPOINTMENT (OUTPATIENT)
Age: 78
End: 2023-03-07

## 2023-03-16 ENCOUNTER — APPOINTMENT (OUTPATIENT)
Dept: OPHTHALMOLOGY | Facility: CLINIC | Age: 78
End: 2023-03-16
Payer: MEDICARE

## 2023-03-16 ENCOUNTER — NON-APPOINTMENT (OUTPATIENT)
Age: 78
End: 2023-03-16

## 2023-03-16 PROCEDURE — 92136 OPHTHALMIC BIOMETRY: CPT

## 2023-03-16 PROCEDURE — 92134 CPTRZ OPH DX IMG PST SGM RTA: CPT

## 2023-03-16 PROCEDURE — 92014 COMPRE OPH EXAM EST PT 1/>: CPT

## 2023-03-20 ENCOUNTER — APPOINTMENT (OUTPATIENT)
Dept: OPHTHALMOLOGY | Facility: CLINIC | Age: 78
End: 2023-03-20
Payer: MEDICARE

## 2023-03-20 ENCOUNTER — NON-APPOINTMENT (OUTPATIENT)
Age: 78
End: 2023-03-20

## 2023-03-20 PROCEDURE — 92201 OPSCPY EXTND RTA DRAW UNI/BI: CPT

## 2023-03-20 PROCEDURE — 92134 CPTRZ OPH DX IMG PST SGM RTA: CPT

## 2023-03-20 PROCEDURE — 92014 COMPRE OPH EXAM EST PT 1/>: CPT

## 2023-03-21 ENCOUNTER — APPOINTMENT (OUTPATIENT)
Dept: HEART AND VASCULAR | Facility: CLINIC | Age: 78
End: 2023-03-21

## 2023-03-28 ENCOUNTER — APPOINTMENT (OUTPATIENT)
Dept: INTERNAL MEDICINE | Facility: CLINIC | Age: 78
End: 2023-03-28
Payer: MEDICARE

## 2023-03-28 ENCOUNTER — NON-APPOINTMENT (OUTPATIENT)
Age: 78
End: 2023-03-28

## 2023-03-28 VITALS
BODY MASS INDEX: 26.51 KG/M2 | WEIGHT: 179 LBS | HEIGHT: 69 IN | HEART RATE: 74 BPM | RESPIRATION RATE: 14 BRPM | OXYGEN SATURATION: 97 % | TEMPERATURE: 97.9 F | SYSTOLIC BLOOD PRESSURE: 119 MMHG | DIASTOLIC BLOOD PRESSURE: 71 MMHG

## 2023-03-28 DIAGNOSIS — R31.29 OTHER MICROSCOPIC HEMATURIA: ICD-10-CM

## 2023-03-28 PROCEDURE — 93000 ELECTROCARDIOGRAM COMPLETE: CPT

## 2023-03-28 PROCEDURE — 99213 OFFICE O/P EST LOW 20 MIN: CPT | Mod: 25

## 2023-03-28 RX ORDER — CELECOXIB 200 MG/1
200 CAPSULE ORAL DAILY
Qty: 30 | Refills: 3 | Status: COMPLETED | COMMUNITY
Start: 2023-02-14 | End: 2023-03-28

## 2023-03-28 NOTE — HISTORY OF PRESENT ILLNESS
[No Pertinent Cardiac History] : no history of aortic stenosis, atrial fibrillation, coronary artery disease, recent myocardial infarction, or implantable device/pacemaker [No Pertinent Pulmonary History] : no history of asthma, COPD, sleep apnea, or smoking [No Adverse Anesthesia Reaction] : no adverse anesthesia reaction in self or family member [Chronic Anticoagulation] : no chronic anticoagulation [Chronic Kidney Disease] : no chronic kidney disease [Diabetes] : no diabetes [(Patient denies any chest pain, claudication, dyspnea on exertion, orthopnea, palpitations or syncope)] : Patient denies any chest pain, claudication, dyspnea on exertion, orthopnea, palpitations or syncope [Good (7-10 METs)] : Good (7-10 METs) [FreeTextEntry1] : RIGHT eye Cataract Extraction w/ Intraocular lens implant [FreeTextEntry2] : 4/4/23 and 4/18/23 [FreeTextEntry3] : Dr. Hernandez [FreeTextEntry4] : h/o macular degeneration w/ catarcts causing vision changes.  [FreeTextEntry7] : Medical Office: [address] [address]

## 2023-03-31 LAB
APPEARANCE: CLEAR
BACTERIA: NEGATIVE
BILIRUBIN URINE: NEGATIVE
BLOOD URINE: NEGATIVE
COLOR: YELLOW
GLUCOSE QUALITATIVE U: NEGATIVE
HYALINE CASTS: 0 /LPF
KETONES URINE: NEGATIVE
LEUKOCYTE ESTERASE URINE: NEGATIVE
MICROSCOPIC-UA: NORMAL
NITRITE URINE: NEGATIVE
PH URINE: 6
PROTEIN URINE: NORMAL
RED BLOOD CELLS URINE: 7 /HPF
SPECIFIC GRAVITY URINE: 1.03
SQUAMOUS EPITHELIAL CELLS: 1 /HPF
UROBILINOGEN URINE: NORMAL
WHITE BLOOD CELLS URINE: 0 /HPF

## 2023-05-02 ENCOUNTER — APPOINTMENT (OUTPATIENT)
Dept: OPHTHALMOLOGY | Facility: CLINIC | Age: 78
End: 2023-05-02

## 2023-06-05 ENCOUNTER — APPOINTMENT (OUTPATIENT)
Dept: ORTHOPEDIC SURGERY | Facility: CLINIC | Age: 78
End: 2023-06-05
Payer: MEDICARE

## 2023-06-05 DIAGNOSIS — M17.12 UNILATERAL PRIMARY OSTEOARTHRITIS, LEFT KNEE: ICD-10-CM

## 2023-06-05 DIAGNOSIS — Z96.652 PRESENCE OF LEFT ARTIFICIAL KNEE JOINT: ICD-10-CM

## 2023-06-05 DIAGNOSIS — M25.462 EFFUSION, LEFT KNEE: ICD-10-CM

## 2023-06-05 PROCEDURE — 73562 X-RAY EXAM OF KNEE 3: CPT | Mod: LT

## 2023-06-05 PROCEDURE — 20610 DRAIN/INJ JOINT/BURSA W/O US: CPT | Mod: LT

## 2023-06-05 PROCEDURE — 99213 OFFICE O/P EST LOW 20 MIN: CPT | Mod: 25

## 2023-06-05 NOTE — DISCUSSION/SUMMARY
[de-identified] : Patient is doing well following their s/p L TKR at March 2022. Patient is developing a reactive effusion with rigorous activities. I reviewed x-rays with them. I have reassured them that their implants are functioning well.\par \par He is encouraged to continue to stay active with a home exercise program, to wear a knee sleeve for compression, and to take Meloxicam prn.\par \par The LEFT knee was aspirated as described above. I have ordered CBC, ESR, CRP, and D-DIMER as a baseline to rule out infection. \par \par I will see them back in 1 year.

## 2023-06-05 NOTE — PROCEDURE
[de-identified] : Discussed at length the procedure of a knee aspiration. The risks, benefits, convalescence and alternatives were reviewed. The possible side effects discussed included but were not limited to: pain, swelling, bleeding and infection. Following this discussion, the knee was prepped with betadine and under a sterile condition, an 18 gauge needle was inserted into the joint through a lateral approach just superior to the patella with the knee in an extended position. The fluid was aspirated and sent for evaluation. Upon withdrawal of the needle the site was cleaned with alcohol and a bandaid applied. The patient tolerated the aspiration well and there were no adverse effects. Post aspiration instructions included no strenuous activity for 24 hours, cryotherapy and if there are any adverse effects to contact the office.\par \par 50 cc's of yellow, clear synovial fluid were aspirated from the LEFT knee.

## 2023-06-05 NOTE — PHYSICAL EXAM
[de-identified] : General appearance: well nourished and hydrated, pleasant, alert and oriented x 3, cooperative.\par HEENT: Normocephalic, EOM intact, Nasal septum midline, Oral cavity clear, External auditory canal clear.\par Cardiovascular: no apparent abnormalities, no lower leg edema, no varicosities, pedal pulses are palpable.\par Lymphatics Lymph nodes: none palpated, Lymphedema: not present.\par Neurologic: sensation is normal, no muscle weakness in upper or lower extremities, patella tendon reflexes intact .\par Dermatologic no apparent skin lesions, moist, warm, no rash.\par Spine:cervical spine appears normal and moves freely, thoracic spine appears normal and moves freely, lumbosacral spine appears normal and moves freely.\par Gait: nonantalgic. \par \par LEFT Knee\par Inspection: trace effusion, no erythema.\par Wounds: healed midline incision.\par Alignment: normal.\par Palpation: no specific tenderness on palpation.\par ROM: Active (in degrees): 0-130.\par Ligamentous laxity (neg): all ligaments appear stable, negative ant. drawer test, negative post. drawer test, stable to varus stress test, stable to valgus stress test, negative Lachman's test, negative pivot shift test.\par Meniscal test: negative Michelle's, negative Bobby.\par Patellofemoral Alignment Test: Q-angle-, normal.\par Muscle Test: good quad strength.\par Leg examination: calf is soft and non-tender. [de-identified] : LEFT knee x-ray, AP, lateral, merchant view taken at the office today demonstrates a total knee replacement in satisfactory position and alignment. No evidence of loosening. The patella sits in a central position. \par \par RIGHT knee x-ray merchant view taken at the office today demonstrates patella at an appropriate height and central position and joint space maintained.

## 2023-06-05 NOTE — HISTORY OF PRESENT ILLNESS
[de-identified] : 77 year old male presents for a follow-up evaluation of his left TKR on March 8th, 2022. He reports continued pain and swelling. The pain waxes and wains, depending on the activity, but is present everyday. He states that he used to be more active, with running, boxing and going to the gym, however reports that he has not been able to do these activities anymore. He even reports that walking for long periods of time causes pain on the lateral aspect of the knee. There are no fevers or chills, but the patient endorses that the knee looks swollen and red only at night.\par

## 2023-06-05 NOTE — ADDENDUM
[FreeTextEntry1] : This note was written by KASIE ACOSTA on 06/05/2023 acting as scribe for Dr. Terrence Del Angel M.D.\par \par I, Dr. Terrence Del Angel, have read and attest that all the information, medical decision making and discharge instructions within are true and accurate. \par \par "This note was written by Sarabjit Buck on 06/05/2023 acting as scribe for Dr. Terrence Del Angel M.D.\par \par I, Dr. Terrence Del Angel, have read and attest that all the information, medical decision making and discharge instructions within are true and accurate."

## 2023-06-06 ENCOUNTER — OUTPATIENT (OUTPATIENT)
Dept: OUTPATIENT SERVICES | Facility: HOSPITAL | Age: 78
LOS: 1 days | End: 2023-06-06

## 2023-06-06 ENCOUNTER — NON-APPOINTMENT (OUTPATIENT)
Age: 78
End: 2023-06-06

## 2023-06-06 DIAGNOSIS — Z98.890 OTHER SPECIFIED POSTPROCEDURAL STATES: Chronic | ICD-10-CM

## 2023-06-06 DIAGNOSIS — Z00.00 ENCOUNTER FOR GENERAL ADULT MEDICAL EXAMINATION WITHOUT ABNORMAL FINDINGS: ICD-10-CM

## 2023-06-06 DIAGNOSIS — Z90.49 ACQUIRED ABSENCE OF OTHER SPECIFIED PARTS OF DIGESTIVE TRACT: Chronic | ICD-10-CM

## 2023-06-06 DIAGNOSIS — Z90.89 ACQUIRED ABSENCE OF OTHER ORGANS: Chronic | ICD-10-CM

## 2023-06-06 LAB
SYCRY CLARITY: NORMAL
SYCRY COLOR: YELLOW
SYCRY ID: ABNORMAL
SYCRY TUBE: NORMAL

## 2023-06-07 ENCOUNTER — APPOINTMENT (OUTPATIENT)
Dept: INTERNAL MEDICINE | Facility: CLINIC | Age: 78
End: 2023-06-07
Payer: MEDICARE

## 2023-06-07 VITALS
DIASTOLIC BLOOD PRESSURE: 75 MMHG | BODY MASS INDEX: 26.07 KG/M2 | HEART RATE: 65 BPM | SYSTOLIC BLOOD PRESSURE: 135 MMHG | OXYGEN SATURATION: 98 % | HEIGHT: 69 IN | TEMPERATURE: 98.3 F | WEIGHT: 176 LBS

## 2023-06-07 DIAGNOSIS — L84 CORNS AND CALLOSITIES: ICD-10-CM

## 2023-06-07 DIAGNOSIS — M10.9 GOUT, UNSPECIFIED: ICD-10-CM

## 2023-06-07 PROCEDURE — 99214 OFFICE O/P EST MOD 30 MIN: CPT

## 2023-06-07 RX ORDER — OFLOXACIN 3 MG/ML
0.3 SOLUTION/ DROPS OPHTHALMIC
Qty: 10 | Refills: 0 | Status: COMPLETED | COMMUNITY
Start: 2023-04-19

## 2023-06-07 RX ORDER — POLYETHYLENE GLYCOL-3350 AND ELECTROLYTES 236; 6.74; 5.86; 2.97; 22.74 G/274.31G; G/274.31G; G/274.31G; G/274.31G; G/274.31G
236 POWDER, FOR SOLUTION ORAL
Qty: 4000 | Refills: 0 | Status: COMPLETED | COMMUNITY
Start: 2023-01-16

## 2023-06-07 RX ORDER — KETOROLAC TROMETHAMINE 5 MG/ML
0.5 SOLUTION OPHTHALMIC
Qty: 5 | Refills: 0 | Status: COMPLETED | COMMUNITY
Start: 2023-03-30

## 2023-06-07 RX ORDER — DICLOFENAC SODIUM 1 MG/ML
0.1 SOLUTION OPHTHALMIC
Qty: 2 | Refills: 0 | Status: COMPLETED | COMMUNITY
Start: 2023-05-15

## 2023-06-07 RX ORDER — PREDNISOLONE ACETATE 10 MG/ML
1 SUSPENSION/ DROPS OPHTHALMIC
Qty: 5 | Refills: 0 | Status: COMPLETED | COMMUNITY
Start: 2023-03-31

## 2023-06-07 NOTE — PHYSICAL EXAM
[No Acute Distress] : no acute distress [Well Developed] : well developed [No Edema] : there was no peripheral edema [de-identified] : left knee mildly tender to deep palpation. mild lateral/medial edema, no erythema

## 2023-06-07 NOTE — HISTORY OF PRESENT ILLNESS
[FreeTextEntry1] : left knee pain [de-identified] : left knee pain\par - s/p knee replacement\par - arthrocentesis 6/5/23 w/ crystals present (type not mentioned)\par - pt had been referred to rheum 2/2023 for positive HUNG and polyarthralgias, but did not make appointment\par - would like to start treatment for gout given persistent knee pain\par - last uric acid lvl 2/14/23 was 4

## 2023-06-12 LAB
B PERT IGG+IGM PNL SER: NORMAL
BACTERIA FLD CULT: NORMAL
COLOR FLD: YELLOW
COMMENT: NORMAL
FLUID INTAKE SUBSTANCE CLASS: NORMAL
LYMPHOCYTES # FLD MANUAL: 26 %
MONOS+MACROS NFR FLD MANUAL: 74 %
NEUTS SEG # FLD MANUAL: 0 %
RBC # FLD MANUAL: 3000 /UL
TOTAL CELLS COUNTED FLD: 890 /UL
TUBE TYPE: NORMAL
URATE AMN-MCNC: NORMAL

## 2023-06-30 ENCOUNTER — APPOINTMENT (OUTPATIENT)
Dept: PULMONOLOGY | Facility: CLINIC | Age: 78
End: 2023-06-30

## 2023-07-13 ENCOUNTER — NON-APPOINTMENT (OUTPATIENT)
Age: 78
End: 2023-07-13

## 2023-07-13 ENCOUNTER — APPOINTMENT (OUTPATIENT)
Dept: OPHTHALMOLOGY | Facility: CLINIC | Age: 78
End: 2023-07-13
Payer: MEDICARE

## 2023-07-13 PROCEDURE — 92012 INTRM OPH EXAM EST PATIENT: CPT

## 2023-07-13 PROCEDURE — 92134 CPTRZ OPH DX IMG PST SGM RTA: CPT

## 2023-07-31 NOTE — ED ADULT TRIAGE NOTE - NS ED NURSE DIRECT TO ROOM YN
No For information on Fall & Injury Prevention, visit: [email or web address] OR  [email or web address] OR  https://www.cdc.gov/steadi/patient.html

## 2023-08-18 ENCOUNTER — APPOINTMENT (OUTPATIENT)
Dept: OPHTHALMOLOGY | Facility: CLINIC | Age: 78
End: 2023-08-18

## 2023-09-11 RX ORDER — COLCHICINE 0.6 MG/1
0.6 TABLET, FILM COATED ORAL TWICE DAILY
Qty: 60 | Refills: 3 | Status: ACTIVE | COMMUNITY
Start: 2023-06-07 | End: 1900-01-01

## 2023-12-21 ENCOUNTER — APPOINTMENT (OUTPATIENT)
Dept: INTERNAL MEDICINE | Facility: CLINIC | Age: 78
End: 2023-12-21
Payer: MEDICARE

## 2023-12-21 VITALS
OXYGEN SATURATION: 96 % | WEIGHT: 171.38 LBS | HEIGHT: 69 IN | TEMPERATURE: 98.1 F | SYSTOLIC BLOOD PRESSURE: 113 MMHG | DIASTOLIC BLOOD PRESSURE: 65 MMHG | BODY MASS INDEX: 25.38 KG/M2 | HEART RATE: 71 BPM

## 2023-12-21 DIAGNOSIS — H35.3131 NONEXUDATIVE AGE-RELATED MACULAR DEGENERATION, BILATERAL, EARLY DRY STAGE: ICD-10-CM

## 2023-12-21 DIAGNOSIS — R35.0 FREQUENCY OF MICTURITION: ICD-10-CM

## 2023-12-21 DIAGNOSIS — Z85.46 PERSONAL HISTORY OF MALIGNANT NEOPLASM OF PROSTATE: ICD-10-CM

## 2023-12-21 DIAGNOSIS — R05.3 CHRONIC COUGH: ICD-10-CM

## 2023-12-21 DIAGNOSIS — R63.4 ABNORMAL WEIGHT LOSS: ICD-10-CM

## 2023-12-21 DIAGNOSIS — R91.8 OTHER NONSPECIFIC ABNORMAL FINDING OF LUNG FIELD: ICD-10-CM

## 2023-12-21 PROCEDURE — 36415 COLL VENOUS BLD VENIPUNCTURE: CPT

## 2023-12-21 PROCEDURE — 99214 OFFICE O/P EST MOD 30 MIN: CPT | Mod: 25

## 2023-12-21 RX ORDER — ATORVASTATIN CALCIUM 20 MG/1
20 TABLET, FILM COATED ORAL
Qty: 90 | Refills: 3 | Status: ACTIVE | COMMUNITY
Start: 2020-06-18 | End: 1900-01-01

## 2023-12-21 NOTE — HISTORY OF PRESENT ILLNESS
[FreeTextEntry1] : weight loss [de-identified] : cough - started after return from [place] x 2.5 weeks - nonproductive, no fever or chills.   nocturia - wakes up 3-4times/night - last time he felt like this he was dx w/ prostate ca 2009 - h/o prostate CA s/p seeding   weight loss, unintentional - c scope done 2/2023 w/ 1 polyp repeat 2028 - 20lbs over 2 yrs - has noted some possible early satiety - will f/u w/ Dr. Manzano (GI) for EGD

## 2023-12-22 LAB
ALBUMIN SERPL ELPH-MCNC: 4 G/DL
ALP BLD-CCNC: 92 U/L
ALT SERPL-CCNC: 23 U/L
ANION GAP SERPL CALC-SCNC: 9 MMOL/L
APPEARANCE: ABNORMAL
AST SERPL-CCNC: 22 U/L
BACTERIA: NEGATIVE /HPF
BASOPHILS # BLD AUTO: 0.04 K/UL
BASOPHILS NFR BLD AUTO: 0.5 %
BILIRUB SERPL-MCNC: 0.3 MG/DL
BILIRUBIN URINE: ABNORMAL
BLOOD URINE: NEGATIVE
BORDETELLA PARAPERTUSSIS DNA: NOT DETECTED
BORDETELLA PERTUSSIS DNA: NOT DETECTED
BUN SERPL-MCNC: 22 MG/DL
CALCIUM SERPL-MCNC: 9.4 MG/DL
CAST: 0 /LPF
CHLORIDE SERPL-SCNC: 104 MMOL/L
CO2 SERPL-SCNC: 30 MMOL/L
COLOR: NORMAL
CREAT SERPL-MCNC: 0.75 MG/DL
EGFR: 93 ML/MIN/1.73M2
EOSINOPHIL # BLD AUTO: 0.15 K/UL
EOSINOPHIL NFR BLD AUTO: 1.9 %
EPITHELIAL CELLS: 0 /HPF
ESTIMATED AVERAGE GLUCOSE: 120 MG/DL
GLUCOSE QUALITATIVE U: NEGATIVE MG/DL
GLUCOSE SERPL-MCNC: 97 MG/DL
HBA1C MFR BLD HPLC: 5.8 %
HCT VFR BLD CALC: 45.5 %
HGB BLD-MCNC: 14.5 G/DL
IMM GRANULOCYTES NFR BLD AUTO: 0.5 %
INFLUENZA A RESULT: DETECTED
INFLUENZA B RESULT: NOT DETECTED
KETONES URINE: ABNORMAL MG/DL
LEUKOCYTE ESTERASE URINE: NEGATIVE
LYMPHOCYTES # BLD AUTO: 1.21 K/UL
LYMPHOCYTES NFR BLD AUTO: 15 %
MAN DIFF?: NORMAL
MCHC RBC-ENTMCNC: 30.1 PG
MCHC RBC-ENTMCNC: 31.9 GM/DL
MCV RBC AUTO: 94.6 FL
MICROSCOPIC-UA: NORMAL
MONOCYTES # BLD AUTO: 0.58 K/UL
MONOCYTES NFR BLD AUTO: 7.2 %
NEUTROPHILS # BLD AUTO: 6.03 K/UL
NEUTROPHILS NFR BLD AUTO: 74.9 %
NITRITE URINE: NEGATIVE
PH URINE: 6
PLATELET # BLD AUTO: 324 K/UL
POTASSIUM SERPL-SCNC: 5.1 MMOL/L
PROT SERPL-MCNC: 6.6 G/DL
PROTEIN URINE: NEGATIVE MG/DL
PSA SERPL-MCNC: <0.01 NG/ML
RBC # BLD: 4.81 M/UL
RBC # FLD: 12.4 %
RED BLOOD CELLS URINE: NORMAL /HPF
RESP SYN VIRUS RESULT: NOT DETECTED
REVIEW: NORMAL
SARS-COV-2 RESULT: NOT DETECTED
SODIUM SERPL-SCNC: 142 MMOL/L
SPECIFIC GRAVITY URINE: 1.03
TSH SERPL-ACNC: 3.04 UIU/ML
UROBILINOGEN URINE: 0.2 MG/DL
WBC # FLD AUTO: 8.05 K/UL
WHITE BLOOD CELLS URINE: 0 /HPF

## 2023-12-27 ENCOUNTER — OUTPATIENT (OUTPATIENT)
Dept: OUTPATIENT SERVICES | Facility: HOSPITAL | Age: 78
LOS: 1 days | End: 2023-12-27
Payer: MEDICARE

## 2023-12-27 ENCOUNTER — APPOINTMENT (OUTPATIENT)
Dept: CT IMAGING | Facility: HOSPITAL | Age: 78
End: 2023-12-27

## 2023-12-27 DIAGNOSIS — Z90.49 ACQUIRED ABSENCE OF OTHER SPECIFIED PARTS OF DIGESTIVE TRACT: Chronic | ICD-10-CM

## 2023-12-27 DIAGNOSIS — Z90.89 ACQUIRED ABSENCE OF OTHER ORGANS: Chronic | ICD-10-CM

## 2023-12-27 DIAGNOSIS — Z98.890 OTHER SPECIFIED POSTPROCEDURAL STATES: Chronic | ICD-10-CM

## 2023-12-27 PROCEDURE — 71250 CT THORAX DX C-: CPT | Mod: 26

## 2023-12-27 PROCEDURE — 71250 CT THORAX DX C-: CPT

## 2024-01-03 DIAGNOSIS — Z01.818 ENCOUNTER FOR OTHER PREPROCEDURAL EXAMINATION: ICD-10-CM

## 2024-01-03 DIAGNOSIS — J10.1 INFLUENZA DUE TO OTHER IDENTIFIED INFLUENZA VIRUS WITH OTHER RESPIRATORY MANIFESTATIONS: ICD-10-CM

## 2024-01-08 ENCOUNTER — APPOINTMENT (OUTPATIENT)
Dept: OPHTHALMOLOGY | Facility: CLINIC | Age: 79
End: 2024-01-08
Payer: MEDICARE

## 2024-01-08 ENCOUNTER — NON-APPOINTMENT (OUTPATIENT)
Age: 79
End: 2024-01-08

## 2024-01-08 PROCEDURE — 92014 COMPRE OPH EXAM EST PT 1/>: CPT

## 2024-01-08 PROCEDURE — 92134 CPTRZ OPH DX IMG PST SGM RTA: CPT

## 2024-01-09 DIAGNOSIS — G47.00 INSOMNIA, UNSPECIFIED: ICD-10-CM

## 2024-01-09 RX ORDER — PROMETHAZINE HYDROCHLORIDE 6.25 MG/5ML
6.25 SOLUTION ORAL EVERY 8 HOURS
Qty: 210 | Refills: 0 | Status: COMPLETED | COMMUNITY
Start: 2023-12-22 | End: 2024-01-09

## 2024-01-09 RX ORDER — BENZONATATE 100 MG/1
100 CAPSULE ORAL 3 TIMES DAILY
Qty: 30 | Refills: 1 | Status: COMPLETED | COMMUNITY
Start: 2023-12-22 | End: 2024-01-09

## 2024-01-18 ENCOUNTER — OUTPATIENT (OUTPATIENT)
Dept: OUTPATIENT SERVICES | Facility: HOSPITAL | Age: 79
LOS: 1 days | End: 2024-01-18
Payer: MEDICARE

## 2024-01-18 ENCOUNTER — APPOINTMENT (OUTPATIENT)
Dept: MRI IMAGING | Facility: HOSPITAL | Age: 79
End: 2024-01-18

## 2024-01-18 DIAGNOSIS — Z98.890 OTHER SPECIFIED POSTPROCEDURAL STATES: Chronic | ICD-10-CM

## 2024-01-18 DIAGNOSIS — Z90.49 ACQUIRED ABSENCE OF OTHER SPECIFIED PARTS OF DIGESTIVE TRACT: Chronic | ICD-10-CM

## 2024-01-18 DIAGNOSIS — Z90.89 ACQUIRED ABSENCE OF OTHER ORGANS: Chronic | ICD-10-CM

## 2024-01-18 PROCEDURE — 72197 MRI PELVIS W/O & W/DYE: CPT | Mod: 26

## 2024-01-18 PROCEDURE — 72197 MRI PELVIS W/O & W/DYE: CPT

## 2024-01-18 PROCEDURE — A9585: CPT

## 2024-01-30 NOTE — PROCEDURE
[Congested] : congested [Deviated to the Lt] : deviated to the left [Image(s) Captured] : image(s) captured and filed [Topical Lidocaine] : topical lidocaine [Oxymetazoline HCl] : oxymetazoline HCl [Flexible Endoscope] : examined with the flexible endoscope [Serial Number: ___] : Serial Number: [unfilled] [FreeTextEntry6] : Elongated Uvula.  Deviated Nasal Septum.  Turbinate Hypertrophy [de-identified] : Elongated Uvula touching the Epiglottis.  Deviated Nasal Septum.  Turbinate Hypertrophy Bed/Stretcher in lowest position, wheels locked, appropriate side rails in place/Call bell, personal items and telephone in reach/Instruct patient to call for assistance before getting out of bed/chair/stretcher/Non-slip footwear applied when patient is off stretcher/[place] to call system/Physically safe environment - no spills, clutter or unnecessary equipment/Purposeful proactive rounding/Room/bathroom lighting operational, light cord in reach

## 2024-02-07 RX ORDER — MELOXICAM 15 MG/1
15 TABLET ORAL
Qty: 90 | Refills: 0 | Status: ACTIVE | COMMUNITY
Start: 2023-02-27 | End: 1900-01-01

## 2024-04-30 RX ORDER — LORAZEPAM 0.5 MG/1
0.5 TABLET ORAL
Qty: 30 | Refills: 0 | Status: ACTIVE | COMMUNITY
Start: 2024-01-09 | End: 1900-01-01

## 2024-08-12 ENCOUNTER — APPOINTMENT (OUTPATIENT)
Dept: INTERNAL MEDICINE | Facility: CLINIC | Age: 79
End: 2024-08-12

## 2024-08-12 VITALS
HEART RATE: 82 BPM | HEIGHT: 69 IN | BODY MASS INDEX: 26.37 KG/M2 | OXYGEN SATURATION: 97 % | TEMPERATURE: 98.9 F | WEIGHT: 178.06 LBS | DIASTOLIC BLOOD PRESSURE: 64 MMHG | SYSTOLIC BLOOD PRESSURE: 106 MMHG

## 2024-08-12 DIAGNOSIS — Z47.1 AFTERCARE FOLLOWING JOINT REPLACEMENT SURGERY: ICD-10-CM

## 2024-08-12 DIAGNOSIS — I49.1 ATRIAL PREMATURE DEPOLARIZATION: ICD-10-CM

## 2024-08-12 DIAGNOSIS — Z96.652 AFTERCARE FOLLOWING JOINT REPLACEMENT SURGERY: ICD-10-CM

## 2024-08-12 PROCEDURE — 99213 OFFICE O/P EST LOW 20 MIN: CPT

## 2024-08-12 PROCEDURE — G2211 COMPLEX E/M VISIT ADD ON: CPT

## 2024-08-12 NOTE — HISTORY OF PRESENT ILLNESS
[FreeTextEntry1] : follow up [de-identified] : atypical chest pain - h/o normal stress test  w/ Dr. Romero - ROSBB on EKG  - concerned about heart disease. friend in his 60s recently  of MI - pt admits to dietary indiscretions: eats dates, cheese, and wine 3 times/day

## 2024-08-13 ENCOUNTER — APPOINTMENT (OUTPATIENT)
Dept: HEART AND VASCULAR | Facility: CLINIC | Age: 79
End: 2024-08-13
Payer: MEDICARE

## 2024-08-13 ENCOUNTER — APPOINTMENT (OUTPATIENT)
Dept: HEART AND VASCULAR | Facility: CLINIC | Age: 79
End: 2024-08-13

## 2024-08-13 VITALS
HEIGHT: 69 IN | WEIGHT: 178 LBS | DIASTOLIC BLOOD PRESSURE: 72 MMHG | OXYGEN SATURATION: 97 % | HEART RATE: 71 BPM | SYSTOLIC BLOOD PRESSURE: 118 MMHG | TEMPERATURE: 98.7 F | BODY MASS INDEX: 26.36 KG/M2

## 2024-08-13 DIAGNOSIS — Z82.49 FAMILY HISTORY OF ISCHEMIC HEART DISEASE AND OTHER DISEASES OF THE CIRCULATORY SYSTEM: ICD-10-CM

## 2024-08-13 DIAGNOSIS — R06.02 SHORTNESS OF BREATH: ICD-10-CM

## 2024-08-13 DIAGNOSIS — I44.0 ATRIOVENTRICULAR BLOCK, FIRST DEGREE: ICD-10-CM

## 2024-08-13 DIAGNOSIS — Z87.891 PERSONAL HISTORY OF NICOTINE DEPENDENCE: ICD-10-CM

## 2024-08-13 DIAGNOSIS — Z82.3 FAMILY HISTORY OF STROKE: ICD-10-CM

## 2024-08-13 DIAGNOSIS — R07.89 OTHER CHEST PAIN: ICD-10-CM

## 2024-08-13 DIAGNOSIS — I36.1 NONRHEUMATIC TRICUSPID (VALVE) INSUFFICIENCY: ICD-10-CM

## 2024-08-13 DIAGNOSIS — R06.09 OTHER FORMS OF DYSPNEA: ICD-10-CM

## 2024-08-13 DIAGNOSIS — E78.5 HYPERLIPIDEMIA, UNSPECIFIED: ICD-10-CM

## 2024-08-13 DIAGNOSIS — I45.10 UNSPECIFIED RIGHT BUNDLE-BRANCH BLOCK: ICD-10-CM

## 2024-08-13 DIAGNOSIS — Z78.9 OTHER SPECIFIED HEALTH STATUS: ICD-10-CM

## 2024-08-13 PROCEDURE — 36415 COLL VENOUS BLD VENIPUNCTURE: CPT

## 2024-08-13 PROCEDURE — 99204 OFFICE O/P NEW MOD 45 MIN: CPT | Mod: 25

## 2024-08-13 PROCEDURE — 93306 TTE W/DOPPLER COMPLETE: CPT

## 2024-08-13 PROCEDURE — 93000 ELECTROCARDIOGRAM COMPLETE: CPT

## 2024-08-13 NOTE — HISTORY OF PRESENT ILLNESS
[FreeTextEntry1] :  79 year old M with PMHx of HLD, COPD, APCs, RBBB, varicose veins, prediabetes presents to re-establish cardiac care. Last seen in 2015.  Concerned about heart disease, a friend recently passed from an MI. He also has a family history of MI in his brother who passed away from MI at age 65.   Presents with midsternal chest pain that has been present for several years. Occurs once a day, typically occurs at rest. Described as sharp pain like "something poking through my chest" with radiation to the back. Lasts several minutes. Denies associated shortness of breath, dizziness, syncope.   Shortness of breath with walking or going up 2 flights of stairs. Used to be a marathon runner. Currently goes to the gym and walks on the treadmill at incline 9-12 and speed 4 for 30 minutes 5-6 times a week. Denies associated chest pain.  he states that his chest pressure/orozco has been getting worse  Referred for: evaluation of atypical chest pain PCP: Dr. Joe Zamora Diet: 1/2 pound of cheese per day. Eats 1 bag of Brazilian nuts daily. Drinks 2-3 glasses of wine per day. Eats vegetables on occasion. Reduced meat intake, now having red meat once every 2-3 weeks, chicken 2-3 times a week, fish or seafood most of the week.    Pt. denies any chest pain, dyspnea, orthopnea, PND, palpitations, lightheadedness, syncope or lower extremity edema. ================================ Previous workup: Labs (12/2023): A1c 5.8% Cr 0.75 K 5.1 Labs (2/2023):  TG 93 HDL 61    Stress echo (2015): negative for ischemia    Echo (2014): normal lv function EF 60%, minimal - mild mitral regurgitation, atrial septal aneurysm

## 2024-08-13 NOTE — DISCUSSION/SUMMARY
[FreeTextEntry1] : EKG:NSR,RBBB,First degree AVB feel cp/orozco may represent CAD will get TTE/CCTA continue lipitor for HLD get labs

## 2024-08-13 NOTE — PHYSICAL EXAM
[Well Developed] : well developed [Well Nourished] : well nourished [No Acute Distress] : no acute distress [Normal Venous Pressure] : normal venous pressure [No Carotid Bruit] : no carotid bruit [Normal S1, S2] : normal S1, S2 [No Murmur] : no murmur [No Rub] : no rub [No Gallop] : no gallop [Clear Lung Fields] : clear lung fields [Good Air Entry] : good air entry [No Respiratory Distress] : no respiratory distress  [No Edema] : no edema [No Cyanosis] : no cyanosis [No Clubbing] : no clubbing [No Varicosities] : no varicosities [Moves all extremities] : moves all extremities [No Focal Deficits] : no focal deficits [Normal Speech] : normal speech [Alert and Oriented] : alert and oriented [Normal memory] : normal memory

## 2024-08-13 NOTE — REVIEW OF SYSTEMS
[Dyspnea on exertion] : dyspnea during exertion [Chest Discomfort] : chest discomfort [Negative] : Psychiatric

## 2024-08-14 PROBLEM — I36.1 NONRHEUMATIC TRICUSPID VALVE REGURGITATION: Status: ACTIVE | Noted: 2024-08-14

## 2024-08-14 PROBLEM — R06.09 DOE (DYSPNEA ON EXERTION): Status: ACTIVE | Noted: 2024-08-13

## 2024-08-14 LAB
ALBUMIN SERPL ELPH-MCNC: 4.3 G/DL
ALP BLD-CCNC: 93 U/L
ALT SERPL-CCNC: 40 U/L
ANION GAP SERPL CALC-SCNC: 12 MMOL/L
AST SERPL-CCNC: 32 U/L
BILIRUB SERPL-MCNC: 0.3 MG/DL
BUN SERPL-MCNC: 23 MG/DL
CALCIUM SERPL-MCNC: 10 MG/DL
CHLORIDE SERPL-SCNC: 104 MMOL/L
CHOLEST SERPL-MCNC: 175 MG/DL
CO2 SERPL-SCNC: 26 MMOL/L
CREAT SERPL-MCNC: 0.84 MG/DL
EGFR: 89 ML/MIN/1.73M2
GLUCOSE SERPL-MCNC: 96 MG/DL
HCT VFR BLD CALC: 44.8 %
HDLC SERPL-MCNC: 56 MG/DL
HGB BLD-MCNC: 14.4 G/DL
LDLC SERPL CALC-MCNC: 105 MG/DL
MCHC RBC-ENTMCNC: 30 PG
MCHC RBC-ENTMCNC: 32.1 GM/DL
MCV RBC AUTO: 93.3 FL
NONHDLC SERPL-MCNC: 119 MG/DL
PLATELET # BLD AUTO: 305 K/UL
POTASSIUM SERPL-SCNC: 5.4 MMOL/L
PROT SERPL-MCNC: 6.9 G/DL
RBC # BLD: 4.8 M/UL
RBC # FLD: 12.8 %
SODIUM SERPL-SCNC: 143 MMOL/L
TRIGL SERPL-MCNC: 74 MG/DL
WBC # FLD AUTO: 7.57 K/UL

## 2024-09-03 ENCOUNTER — RESULT REVIEW (OUTPATIENT)
Age: 79
End: 2024-09-03

## 2025-01-08 ENCOUNTER — APPOINTMENT (OUTPATIENT)
Dept: INTERNAL MEDICINE | Facility: CLINIC | Age: 80
End: 2025-01-08
Payer: MEDICARE

## 2025-01-08 VITALS
HEART RATE: 67 BPM | HEIGHT: 69 IN | TEMPERATURE: 97.2 F | WEIGHT: 181.31 LBS | DIASTOLIC BLOOD PRESSURE: 72 MMHG | OXYGEN SATURATION: 97 % | SYSTOLIC BLOOD PRESSURE: 131 MMHG | BODY MASS INDEX: 26.85 KG/M2

## 2025-01-08 DIAGNOSIS — R19.4 CHANGE IN BOWEL HABIT: ICD-10-CM

## 2025-01-08 DIAGNOSIS — E78.5 HYPERLIPIDEMIA, UNSPECIFIED: ICD-10-CM

## 2025-01-08 DIAGNOSIS — J43.9 EMPHYSEMA, UNSPECIFIED: ICD-10-CM

## 2025-01-08 DIAGNOSIS — M25.541 PAIN IN JOINTS OF RIGHT HAND: ICD-10-CM

## 2025-01-08 DIAGNOSIS — R53.82 CHRONIC FATIGUE, UNSPECIFIED: ICD-10-CM

## 2025-01-08 PROCEDURE — G2211 COMPLEX E/M VISIT ADD ON: CPT

## 2025-01-08 PROCEDURE — 36415 COLL VENOUS BLD VENIPUNCTURE: CPT

## 2025-01-08 PROCEDURE — 99214 OFFICE O/P EST MOD 30 MIN: CPT

## 2025-01-08 RX ORDER — CELECOXIB 200 MG/1
200 CAPSULE ORAL
Qty: 30 | Refills: 2 | Status: ACTIVE | COMMUNITY
Start: 2025-01-08 | End: 1900-01-01

## 2025-01-10 LAB
ALBUMIN SERPL ELPH-MCNC: 4.1 G/DL
ALP BLD-CCNC: 98 U/L
ALT SERPL-CCNC: 39 U/L
ANION GAP SERPL CALC-SCNC: 10 MMOL/L
APPEARANCE: CLEAR
AST SERPL-CCNC: 35 U/L
BACTERIA: NEGATIVE /HPF
BASOPHILS # BLD AUTO: 0.03 K/UL
BASOPHILS NFR BLD AUTO: 0.5 %
BILIRUB SERPL-MCNC: 0.3 MG/DL
BILIRUBIN URINE: NEGATIVE
BLOOD URINE: NEGATIVE
BUN SERPL-MCNC: 18 MG/DL
CALCIUM SERPL-MCNC: 9.4 MG/DL
CAST: 0 /LPF
CHLORIDE SERPL-SCNC: 102 MMOL/L
CHOLEST SERPL-MCNC: 151 MG/DL
CO2 SERPL-SCNC: 27 MMOL/L
COLOR: YELLOW
CREAT SERPL-MCNC: 0.74 MG/DL
EGFR: 93 ML/MIN/1.73M2
EOSINOPHIL # BLD AUTO: 0.22 K/UL
EOSINOPHIL NFR BLD AUTO: 3.4 %
EPITHELIAL CELLS: 1 /HPF
ESTRADIOL SERPL-MCNC: 36 PG/ML
FSH SERPL-MCNC: 16 IU/L
GLUCOSE QUALITATIVE U: NEGATIVE MG/DL
GLUCOSE SERPL-MCNC: 105 MG/DL
HCT VFR BLD CALC: 43.5 %
HDLC SERPL-MCNC: 62 MG/DL
HGB BLD-MCNC: 14.5 G/DL
IMM GRANULOCYTES NFR BLD AUTO: 0.5 %
KETONES URINE: NEGATIVE MG/DL
LDLC SERPL CALC-MCNC: 75 MG/DL
LEUKOCYTE ESTERASE URINE: NEGATIVE
LH SERPL-ACNC: 16.8 IU/L
LYMPHOCYTES # BLD AUTO: 2.01 K/UL
LYMPHOCYTES NFR BLD AUTO: 30.7 %
MAN DIFF?: NORMAL
MCHC RBC-ENTMCNC: 30.7 PG
MCHC RBC-ENTMCNC: 33.3 G/DL
MCV RBC AUTO: 92 FL
MICROSCOPIC-UA: NORMAL
MONOCYTES # BLD AUTO: 0.45 K/UL
MONOCYTES NFR BLD AUTO: 6.9 %
NEUTROPHILS # BLD AUTO: 3.81 K/UL
NEUTROPHILS NFR BLD AUTO: 58 %
NITRITE URINE: NEGATIVE
NONHDLC SERPL-MCNC: 88 MG/DL
PH URINE: 7
PLATELET # BLD AUTO: 240 K/UL
POTASSIUM SERPL-SCNC: 4.2 MMOL/L
PROLACTIN SERPL-MCNC: 17.1 NG/ML
PROT SERPL-MCNC: 6.5 G/DL
PROTEIN URINE: NEGATIVE MG/DL
RBC # BLD: 4.73 M/UL
RBC # FLD: 12.4 %
RED BLOOD CELLS URINE: 0 /HPF
REVIEW: NORMAL
SODIUM SERPL-SCNC: 139 MMOL/L
SPECIFIC GRAVITY URINE: 1.01
TESTOST SERPL-MCNC: 424 NG/DL
TRIGL SERPL-MCNC: 66 MG/DL
TSH SERPL-ACNC: 3.52 UIU/ML
UROBILINOGEN URINE: 0.2 MG/DL
WBC # FLD AUTO: 6.55 K/UL
WHITE BLOOD CELLS URINE: 0 /HPF

## 2025-04-09 ENCOUNTER — APPOINTMENT (OUTPATIENT)
Dept: INTERNAL MEDICINE | Facility: CLINIC | Age: 80
End: 2025-04-09
Payer: MEDICARE

## 2025-04-09 VITALS
BODY MASS INDEX: 26.96 KG/M2 | SYSTOLIC BLOOD PRESSURE: 125 MMHG | WEIGHT: 182 LBS | HEIGHT: 69 IN | TEMPERATURE: 99 F | DIASTOLIC BLOOD PRESSURE: 73 MMHG | HEART RATE: 68 BPM | OXYGEN SATURATION: 95 %

## 2025-04-09 DIAGNOSIS — R35.1 NOCTURIA: ICD-10-CM

## 2025-04-09 DIAGNOSIS — E78.5 HYPERLIPIDEMIA, UNSPECIFIED: ICD-10-CM

## 2025-04-09 DIAGNOSIS — R35.0 FREQUENCY OF MICTURITION: ICD-10-CM

## 2025-04-09 DIAGNOSIS — Z00.00 ENCOUNTER FOR GENERAL ADULT MEDICAL EXAMINATION W/OUT ABNORMAL FINDINGS: ICD-10-CM

## 2025-04-09 PROCEDURE — G0444 DEPRESSION SCREEN ANNUAL: CPT

## 2025-04-09 PROCEDURE — 36415 COLL VENOUS BLD VENIPUNCTURE: CPT

## 2025-04-09 PROCEDURE — G0439: CPT

## 2025-04-09 RX ORDER — TAMSULOSIN HYDROCHLORIDE 0.4 MG/1
0.4 CAPSULE ORAL
Qty: 90 | Refills: 3 | Status: ACTIVE | COMMUNITY
Start: 2025-04-09 | End: 1900-01-01

## 2025-04-11 LAB
25(OH)D3 SERPL-MCNC: 20 NG/ML
ALBUMIN SERPL ELPH-MCNC: 3.9 G/DL
ALP BLD-CCNC: 83 U/L
ALT SERPL-CCNC: 28 U/L
ANION GAP SERPL CALC-SCNC: 10 MMOL/L
APPEARANCE: CLEAR
AST SERPL-CCNC: 30 U/L
BACTERIA: NEGATIVE /HPF
BASOPHILS # BLD AUTO: 0.03 K/UL
BASOPHILS NFR BLD AUTO: 0.5 %
BILIRUB SERPL-MCNC: 0.3 MG/DL
BILIRUBIN URINE: NEGATIVE
BLOOD URINE: NEGATIVE
BUN SERPL-MCNC: 26 MG/DL
C TRACH RRNA SPEC QL NAA+PROBE: NOT DETECTED
CALCIUM SERPL-MCNC: 9.2 MG/DL
CAST: 0 /LPF
CHLORIDE SERPL-SCNC: 104 MMOL/L
CHOLEST SERPL-MCNC: 156 MG/DL
CO2 SERPL-SCNC: 27 MMOL/L
COLOR: YELLOW
CREAT SERPL-MCNC: 0.74 MG/DL
EGFRCR SERPLBLD CKD-EPI 2021: 93 ML/MIN/1.73M2
EOSINOPHIL # BLD AUTO: 0.16 K/UL
EOSINOPHIL NFR BLD AUTO: 2.5 %
EPITHELIAL CELLS: 1 /HPF
ESTIMATED AVERAGE GLUCOSE: 117 MG/DL
GLUCOSE QUALITATIVE U: NEGATIVE MG/DL
GLUCOSE SERPL-MCNC: 123 MG/DL
HBA1C MFR BLD HPLC: 5.7 %
HCT VFR BLD CALC: 41.2 %
HDLC SERPL-MCNC: 63 MG/DL
HGB BLD-MCNC: 14.1 G/DL
HIV1+2 AB SPEC QL IA.RAPID: NONREACTIVE
IMM GRANULOCYTES NFR BLD AUTO: 0.2 %
KETONES URINE: NEGATIVE MG/DL
LDLC SERPL-MCNC: 79 MG/DL
LEUKOCYTE ESTERASE URINE: NEGATIVE
LYMPHOCYTES # BLD AUTO: 1.72 K/UL
LYMPHOCYTES NFR BLD AUTO: 26.6 %
MAN DIFF?: NORMAL
MCHC RBC-ENTMCNC: 30.2 PG
MCHC RBC-ENTMCNC: 34.2 G/DL
MCV RBC AUTO: 88.2 FL
MICROSCOPIC-UA: NORMAL
MONOCYTES # BLD AUTO: 0.48 K/UL
MONOCYTES NFR BLD AUTO: 7.4 %
N GONORRHOEA RRNA SPEC QL NAA+PROBE: NOT DETECTED
NEUTROPHILS # BLD AUTO: 4.06 K/UL
NEUTROPHILS NFR BLD AUTO: 62.8 %
NITRITE URINE: NEGATIVE
NONHDLC SERPL-MCNC: 94 MG/DL
PH URINE: 7
PLATELET # BLD AUTO: 211 K/UL
POTASSIUM SERPL-SCNC: 4.6 MMOL/L
PROT SERPL-MCNC: 6.1 G/DL
PROTEIN URINE: NEGATIVE MG/DL
PSA SERPL-MCNC: <0.01 NG/ML
RBC # BLD: 4.67 M/UL
RBC # FLD: 12.6 %
RED BLOOD CELLS URINE: 10 /HPF
REVIEW: NORMAL
SODIUM SERPL-SCNC: 141 MMOL/L
SOURCE AMPLIFICATION: NORMAL
SPECIFIC GRAVITY URINE: 1.02
TRIGL SERPL-MCNC: 78 MG/DL
TSH SERPL-ACNC: 3.23 UIU/ML
UROBILINOGEN URINE: 0.2 MG/DL
WBC # FLD AUTO: 6.46 K/UL
WHITE BLOOD CELLS URINE: 0 /HPF

## 2025-05-15 ENCOUNTER — APPOINTMENT (OUTPATIENT)
Dept: UROLOGY | Facility: CLINIC | Age: 80
End: 2025-05-15

## (undated) DEVICE — DRAIN JACKSON PRATT 3 SPRING RESERVOIR W 10FR PVC DRAIN

## (undated) DEVICE — SUT VICRYL 0 18" CT-1 UNDYED (POP-OFF)

## (undated) DEVICE — DRSG AQUACEL 3.5 X 4"

## (undated) DEVICE — SYR CONTROL LUER LOK 10CC

## (undated) DEVICE — DRAPE U POLY BLUE 60X72"

## (undated) DEVICE — DRAPE 1/2 SHEET 40X57"

## (undated) DEVICE — GLV 9 PROTEXIS (WHITE)

## (undated) DEVICE — DRSG WEBRIL 6"

## (undated) DEVICE — SUT VICRYL 2-0 27" CT-1 UNDYED

## (undated) DEVICE — ZIMMER BLADE PATELLA REAMER 35MM

## (undated) DEVICE — GLV 8.5 PROTEXIS (WHITE)

## (undated) DEVICE — HOOD T5 PEELAWAY

## (undated) DEVICE — STAPLER SKIN MULTI DIRECTION W35

## (undated) DEVICE — NDL HYPO SAFE 22G X 1.5" (BLACK)

## (undated) DEVICE — DRSG ACE BANDAGE 6"

## (undated) DEVICE — DRSG AQUACEL 3.5 X 10"

## (undated) DEVICE — WARMING BLANKET UPPER ADULT

## (undated) DEVICE — SPECIMEN CONTAINER 4OZ

## (undated) DEVICE — SAW SAGITTAL 2108 SERIES 20.5X1.27X85MM

## (undated) DEVICE — PACK TOTAL KNEE

## (undated) DEVICE — DRSG TRACH DRAINAGE 4X4